# Patient Record
Sex: FEMALE | Race: WHITE | Employment: FULL TIME | URBAN - METROPOLITAN AREA
[De-identification: names, ages, dates, MRNs, and addresses within clinical notes are randomized per-mention and may not be internally consistent; named-entity substitution may affect disease eponyms.]

---

## 2023-07-26 ENCOUNTER — LAB ENCOUNTER (OUTPATIENT)
Dept: LAB | Age: 68
End: 2023-07-26
Attending: STUDENT IN AN ORGANIZED HEALTH CARE EDUCATION/TRAINING PROGRAM
Payer: MEDICARE

## 2023-07-26 ENCOUNTER — HOSPITAL ENCOUNTER (OUTPATIENT)
Dept: GENERAL RADIOLOGY | Age: 68
Discharge: HOME | End: 2023-07-26
Attending: STUDENT IN AN ORGANIZED HEALTH CARE EDUCATION/TRAINING PROGRAM
Payer: MEDICARE

## 2023-07-26 DIAGNOSIS — Z01.89 RADIOLOGICAL EXAMINATION, NOT ELSEWHERE CLASSIFIED: Primary | ICD-10-CM

## 2023-07-26 DIAGNOSIS — Z01.818 PREOPERATIVE EXAMINATION: ICD-10-CM

## 2023-07-26 LAB
ALBUMIN SERPL-MCNC: 3.8 G/DL (ref 3.4–5)
ALBUMIN/GLOB SERPL: 1.2 {RATIO} (ref 1–2)
ALP LIVER SERPL-CCNC: 95 U/L
ALT SERPL-CCNC: 29 U/L
ANION GAP SERPL CALC-SCNC: 4 MMOL/L (ref 0–18)
APTT PPP: 26.6 SECONDS (ref 23.3–35.6)
AST SERPL-CCNC: 15 U/L (ref 15–37)
BASOPHILS # BLD AUTO: 0.01 X10(3) UL (ref 0–0.2)
BASOPHILS NFR BLD AUTO: 0.2 %
BILIRUB SERPL-MCNC: 0.5 MG/DL (ref 0.1–2)
BILIRUB UR QL STRIP.AUTO: NEGATIVE
BUN BLD-MCNC: 21 MG/DL (ref 7–18)
CALCIUM BLD-MCNC: 8.8 MG/DL (ref 8.5–10.1)
CHLORIDE SERPL-SCNC: 106 MMOL/L (ref 98–112)
CLARITY UR REFRACT.AUTO: CLEAR
CO2 SERPL-SCNC: 28 MMOL/L (ref 21–32)
CREAT BLD-MCNC: 0.8 MG/DL
EGFRCR SERPLBLD CKD-EPI 2021: 81 ML/MIN/1.73M2 (ref 60–?)
EOSINOPHIL # BLD AUTO: 0.02 X10(3) UL (ref 0–0.7)
EOSINOPHIL NFR BLD AUTO: 0.5 %
ERYTHROCYTE [DISTWIDTH] IN BLOOD BY AUTOMATED COUNT: 12.3 %
FASTING STATUS PATIENT QL REPORTED: YES
GLOBULIN PLAS-MCNC: 3.2 G/DL (ref 2.8–4.4)
GLUCOSE BLD-MCNC: 107 MG/DL (ref 70–99)
GLUCOSE UR STRIP.AUTO-MCNC: NEGATIVE MG/DL
HCT VFR BLD AUTO: 39.8 %
HGB BLD-MCNC: 13.2 G/DL
IMM GRANULOCYTES # BLD AUTO: 0.05 X10(3) UL (ref 0–1)
IMM GRANULOCYTES NFR BLD: 1.2 %
INR BLD: 0.91 (ref 0.85–1.16)
KETONES UR STRIP.AUTO-MCNC: NEGATIVE MG/DL
LYMPHOCYTES # BLD AUTO: 1.39 X10(3) UL (ref 1–4)
LYMPHOCYTES NFR BLD AUTO: 32 %
MCH RBC QN AUTO: 31.6 PG (ref 26–34)
MCHC RBC AUTO-ENTMCNC: 33.2 G/DL (ref 31–37)
MCV RBC AUTO: 95.2 FL
MONOCYTES # BLD AUTO: 0.51 X10(3) UL (ref 0.1–1)
MONOCYTES NFR BLD AUTO: 11.8 %
NEUTROPHILS # BLD AUTO: 2.36 X10 (3) UL (ref 1.5–7.7)
NEUTROPHILS # BLD AUTO: 2.36 X10(3) UL (ref 1.5–7.7)
NEUTROPHILS NFR BLD AUTO: 54.3 %
NITRITE UR QL STRIP.AUTO: NEGATIVE
OSMOLALITY SERPL CALC.SUM OF ELEC: 289 MOSM/KG (ref 275–295)
PH UR STRIP.AUTO: 5 [PH] (ref 5–8)
PLATELET # BLD AUTO: 139 10(3)UL (ref 150–450)
POTASSIUM SERPL-SCNC: 4.4 MMOL/L (ref 3.5–5.1)
PROT SERPL-MCNC: 7 G/DL (ref 6.4–8.2)
PROT UR STRIP.AUTO-MCNC: NEGATIVE MG/DL
PROTHROMBIN TIME: 12.3 SECONDS (ref 11.6–14.8)
RBC # BLD AUTO: 4.18 X10(6)UL
RBC UR QL AUTO: NEGATIVE
SODIUM SERPL-SCNC: 138 MMOL/L (ref 136–145)
SP GR UR STRIP.AUTO: 1.01 (ref 1–1.03)
UROBILINOGEN UR STRIP.AUTO-MCNC: <2 MG/DL
WBC # BLD AUTO: 4.3 X10(3) UL (ref 4–11)

## 2023-07-26 PROCEDURE — 87147 CULTURE TYPE IMMUNOLOGIC: CPT

## 2023-07-26 PROCEDURE — 81001 URINALYSIS AUTO W/SCOPE: CPT

## 2023-07-26 PROCEDURE — 87086 URINE CULTURE/COLONY COUNT: CPT

## 2023-07-26 PROCEDURE — 85730 THROMBOPLASTIN TIME PARTIAL: CPT

## 2023-07-26 PROCEDURE — 87081 CULTURE SCREEN ONLY: CPT

## 2023-07-26 PROCEDURE — 93005 ELECTROCARDIOGRAM TRACING: CPT

## 2023-07-26 PROCEDURE — 85610 PROTHROMBIN TIME: CPT

## 2023-07-26 PROCEDURE — 36415 COLL VENOUS BLD VENIPUNCTURE: CPT

## 2023-07-26 PROCEDURE — 71046 X-RAY EXAM CHEST 2 VIEWS: CPT | Performed by: STUDENT IN AN ORGANIZED HEALTH CARE EDUCATION/TRAINING PROGRAM

## 2023-07-26 PROCEDURE — 80053 COMPREHEN METABOLIC PANEL: CPT

## 2023-07-26 PROCEDURE — 93010 ELECTROCARDIOGRAM REPORT: CPT | Performed by: INTERNAL MEDICINE

## 2023-07-26 PROCEDURE — 85025 COMPLETE CBC W/AUTO DIFF WBC: CPT

## 2023-07-27 LAB
ATRIAL RATE: 54 BPM
P AXIS: 28 DEGREES
P-R INTERVAL: 164 MS
Q-T INTERVAL: 426 MS
QRS DURATION: 86 MS
QTC CALCULATION (BEZET): 403 MS
R AXIS: 55 DEGREES
T AXIS: 49 DEGREES
VENTRICULAR RATE: 54 BPM

## 2023-07-27 RX ORDER — PREGABALIN 50 MG/1
1 CAPSULE ORAL 3 TIMES DAILY
COMMUNITY
Start: 2022-07-28

## 2023-07-27 RX ORDER — MELOXICAM 15 MG/1
1 TABLET ORAL DAILY
COMMUNITY
Start: 2020-08-31

## 2023-08-03 ENCOUNTER — HOSPITAL ENCOUNTER (INPATIENT)
Facility: HOSPITAL | Age: 68
LOS: 1 days | Discharge: HOME | End: 2023-08-04
Attending: STUDENT IN AN ORGANIZED HEALTH CARE EDUCATION/TRAINING PROGRAM | Admitting: STUDENT IN AN ORGANIZED HEALTH CARE EDUCATION/TRAINING PROGRAM
Payer: MEDICARE

## 2023-08-03 ENCOUNTER — APPOINTMENT (OUTPATIENT)
Dept: GENERAL RADIOLOGY | Facility: HOSPITAL | Age: 68
End: 2023-08-03
Attending: STUDENT IN AN ORGANIZED HEALTH CARE EDUCATION/TRAINING PROGRAM
Payer: MEDICARE

## 2023-08-03 ENCOUNTER — ANESTHESIA (OUTPATIENT)
Dept: SURGERY | Facility: HOSPITAL | Age: 68
End: 2023-08-03
Payer: MEDICARE

## 2023-08-03 ENCOUNTER — ANESTHESIA EVENT (OUTPATIENT)
Dept: SURGERY | Facility: HOSPITAL | Age: 68
End: 2023-08-03
Payer: MEDICARE

## 2023-08-03 DIAGNOSIS — Z01.818 PREOP TESTING: Primary | ICD-10-CM

## 2023-08-03 PROBLEM — I10 ESSENTIAL HYPERTENSION: Status: ACTIVE | Noted: 2023-08-03

## 2023-08-03 PROBLEM — E66.01 MORBID OBESITY WITH BMI OF 40.0-44.9, ADULT (HCC): Status: ACTIVE | Noted: 2023-08-03

## 2023-08-03 PROBLEM — E03.9 HYPOTHYROIDISM: Status: ACTIVE | Noted: 2023-08-03

## 2023-08-03 PROBLEM — T84.063A POLYETHYLENE WEAR OF LEFT KNEE JOINT PROSTHESIS (HCC): Status: ACTIVE | Noted: 2023-08-03

## 2023-08-03 LAB
ANTIBODY SCREEN: NEGATIVE
DEPRECATED RDW RBC AUTO: 41.9 FL (ref 35.1–46.3)
ERYTHROCYTE [DISTWIDTH] IN BLOOD BY AUTOMATED COUNT: 12 % (ref 11–15)
HCT VFR BLD AUTO: 37.5 %
HGB BLD-MCNC: 12.6 G/DL
MCH RBC QN AUTO: 32 PG (ref 26–34)
MCHC RBC AUTO-ENTMCNC: 33.6 G/DL (ref 31–37)
MCV RBC AUTO: 95.2 FL
PLATELET # BLD AUTO: 130 10(3)UL (ref 150–450)
RBC # BLD AUTO: 3.94 X10(6)UL
RH BLOOD TYPE: POSITIVE
RH BLOOD TYPE: POSITIVE
WBC # BLD AUTO: 4.3 X10(3) UL (ref 4–11)

## 2023-08-03 PROCEDURE — 73560 X-RAY EXAM OF KNEE 1 OR 2: CPT | Performed by: STUDENT IN AN ORGANIZED HEALTH CARE EDUCATION/TRAINING PROGRAM

## 2023-08-03 PROCEDURE — 0S9D3ZZ DRAINAGE OF LEFT KNEE JOINT, PERCUTANEOUS APPROACH: ICD-10-PCS | Performed by: STUDENT IN AN ORGANIZED HEALTH CARE EDUCATION/TRAINING PROGRAM

## 2023-08-03 PROCEDURE — 0SRW0J9 REPLACEMENT OF LEFT KNEE JOINT, TIBIAL SURFACE WITH SYNTHETIC SUBSTITUTE, CEMENTED, OPEN APPROACH: ICD-10-PCS | Performed by: STUDENT IN AN ORGANIZED HEALTH CARE EDUCATION/TRAINING PROGRAM

## 2023-08-03 PROCEDURE — 3E0U3BZ INTRODUCTION OF ANESTHETIC AGENT INTO JOINTS, PERCUTANEOUS APPROACH: ICD-10-PCS | Performed by: STUDENT IN AN ORGANIZED HEALTH CARE EDUCATION/TRAINING PROGRAM

## 2023-08-03 PROCEDURE — 0SPW0JZ REMOVAL OF SYNTHETIC SUBSTITUTE FROM LEFT KNEE JOINT, TIBIAL SURFACE, OPEN APPROACH: ICD-10-PCS | Performed by: STUDENT IN AN ORGANIZED HEALTH CARE EDUCATION/TRAINING PROGRAM

## 2023-08-03 DEVICE — IMPLANTABLE DEVICE: Type: IMPLANTABLE DEVICE | Site: KNEE | Status: FUNCTIONAL

## 2023-08-03 DEVICE — NEX STR STEM EXT 15 DIX75 30: Type: IMPLANTABLE DEVICE | Site: KNEE | Status: FUNCTIONAL

## 2023-08-03 DEVICE — IMPLANTABLE DEVICE
Type: IMPLANTABLE DEVICE | Site: KNEE | Status: FUNCTIONAL
Brand: REFOBACIN® BONE CEMENT R

## 2023-08-03 DEVICE — IMPLANTABLE DEVICE
Type: IMPLANTABLE DEVICE | Site: KNEE | Status: FUNCTIONAL
Brand: NEXGEN® LEGACY® PROLONG®

## 2023-08-03 DEVICE — NEX A/P WDGD PRCT TIB PLT SZ 3: Type: IMPLANTABLE DEVICE | Site: KNEE | Status: FUNCTIONAL

## 2023-08-03 RX ORDER — SODIUM CHLORIDE, SODIUM LACTATE, POTASSIUM CHLORIDE, CALCIUM CHLORIDE 600; 310; 30; 20 MG/100ML; MG/100ML; MG/100ML; MG/100ML
INJECTION, SOLUTION INTRAVENOUS CONTINUOUS
Status: DISCONTINUED | OUTPATIENT
Start: 2023-08-03 | End: 2023-08-03 | Stop reason: HOSPADM

## 2023-08-03 RX ORDER — LEVOTHYROXINE SODIUM 0.07 MG/1
150 TABLET ORAL
Status: DISCONTINUED | OUTPATIENT
Start: 2023-08-04 | End: 2023-08-04

## 2023-08-03 RX ORDER — DIPHENHYDRAMINE HCL 25 MG
25 CAPSULE ORAL EVERY 4 HOURS PRN
Status: DISCONTINUED | OUTPATIENT
Start: 2023-08-03 | End: 2023-08-04

## 2023-08-03 RX ORDER — ACETAMINOPHEN 500 MG
1000 TABLET ORAL EVERY 6 HOURS
Status: DISCONTINUED | OUTPATIENT
Start: 2023-08-03 | End: 2023-08-04

## 2023-08-03 RX ORDER — HYDROMORPHONE HYDROCHLORIDE 1 MG/ML
0.4 INJECTION, SOLUTION INTRAMUSCULAR; INTRAVENOUS; SUBCUTANEOUS EVERY 2 HOUR PRN
Status: DISCONTINUED | OUTPATIENT
Start: 2023-08-03 | End: 2023-08-04

## 2023-08-03 RX ORDER — TRANEXAMIC ACID 10 MG/ML
1000 INJECTION, SOLUTION INTRAVENOUS ONCE
Status: DISCONTINUED | OUTPATIENT
Start: 2023-08-03 | End: 2023-08-03 | Stop reason: HOSPADM

## 2023-08-03 RX ORDER — HYDROMORPHONE HYDROCHLORIDE 1 MG/ML
0.6 INJECTION, SOLUTION INTRAMUSCULAR; INTRAVENOUS; SUBCUTANEOUS EVERY 5 MIN PRN
Status: DISCONTINUED | OUTPATIENT
Start: 2023-08-03 | End: 2023-08-03 | Stop reason: HOSPADM

## 2023-08-03 RX ORDER — CEFAZOLIN SODIUM/WATER 2 G/20 ML
2 SYRINGE (ML) INTRAVENOUS ONCE
Status: COMPLETED | OUTPATIENT
Start: 2023-08-03 | End: 2023-08-03

## 2023-08-03 RX ORDER — POLYETHYLENE GLYCOL 3350 17 G/17G
17 POWDER, FOR SOLUTION ORAL DAILY PRN
Status: DISCONTINUED | OUTPATIENT
Start: 2023-08-03 | End: 2023-08-04

## 2023-08-03 RX ORDER — FAMOTIDINE 20 MG/1
20 TABLET, FILM COATED ORAL ONCE
Status: COMPLETED | OUTPATIENT
Start: 2023-08-03 | End: 2023-08-03

## 2023-08-03 RX ORDER — HYDROMORPHONE HYDROCHLORIDE 1 MG/ML
0.4 INJECTION, SOLUTION INTRAMUSCULAR; INTRAVENOUS; SUBCUTANEOUS EVERY 5 MIN PRN
Status: DISCONTINUED | OUTPATIENT
Start: 2023-08-03 | End: 2023-08-03 | Stop reason: HOSPADM

## 2023-08-03 RX ORDER — VANCOMYCIN 1.75 GRAM/500 ML IN 0.9 % SODIUM CHLORIDE INTRAVENOUS
15 ONCE
Status: COMPLETED | OUTPATIENT
Start: 2023-08-03 | End: 2023-08-03

## 2023-08-03 RX ORDER — METOCLOPRAMIDE 10 MG/1
10 TABLET ORAL ONCE
Status: COMPLETED | OUTPATIENT
Start: 2023-08-03 | End: 2023-08-03

## 2023-08-03 RX ORDER — OXYCODONE HYDROCHLORIDE 5 MG/1
5 TABLET ORAL EVERY 4 HOURS PRN
Status: DISCONTINUED | OUTPATIENT
Start: 2023-08-03 | End: 2023-08-04

## 2023-08-03 RX ORDER — CEFAZOLIN SODIUM/WATER 2 G/20 ML
2 SYRINGE (ML) INTRAVENOUS EVERY 8 HOURS
Status: COMPLETED | OUTPATIENT
Start: 2023-08-04 | End: 2023-08-04

## 2023-08-03 RX ORDER — ENEMA 19; 7 G/133ML; G/133ML
1 ENEMA RECTAL ONCE AS NEEDED
Status: DISCONTINUED | OUTPATIENT
Start: 2023-08-03 | End: 2023-08-04

## 2023-08-03 RX ORDER — DEXAMETHASONE SODIUM PHOSPHATE 4 MG/ML
VIAL (ML) INJECTION AS NEEDED
Status: DISCONTINUED | OUTPATIENT
Start: 2023-08-03 | End: 2023-08-03 | Stop reason: SURG

## 2023-08-03 RX ORDER — SPIRONOLACTONE 50 MG/1
100 TABLET, FILM COATED ORAL DAILY
Status: DISCONTINUED | OUTPATIENT
Start: 2023-08-04 | End: 2023-08-04

## 2023-08-03 RX ORDER — NALOXONE HYDROCHLORIDE 0.4 MG/ML
80 INJECTION, SOLUTION INTRAMUSCULAR; INTRAVENOUS; SUBCUTANEOUS AS NEEDED
Status: DISCONTINUED | OUTPATIENT
Start: 2023-08-03 | End: 2023-08-03 | Stop reason: HOSPADM

## 2023-08-03 RX ORDER — DIPHENHYDRAMINE HYDROCHLORIDE 50 MG/ML
12.5 INJECTION INTRAMUSCULAR; INTRAVENOUS EVERY 4 HOURS PRN
Status: DISCONTINUED | OUTPATIENT
Start: 2023-08-03 | End: 2023-08-04

## 2023-08-03 RX ORDER — DIPHENHYDRAMINE HYDROCHLORIDE 50 MG/ML
25 INJECTION INTRAMUSCULAR; INTRAVENOUS ONCE AS NEEDED
Status: ACTIVE | OUTPATIENT
Start: 2023-08-03 | End: 2023-08-03

## 2023-08-03 RX ORDER — DOCUSATE SODIUM 100 MG/1
100 CAPSULE, LIQUID FILLED ORAL 2 TIMES DAILY
Status: DISCONTINUED | OUTPATIENT
Start: 2023-08-03 | End: 2023-08-04

## 2023-08-03 RX ORDER — SODIUM CHLORIDE, SODIUM LACTATE, POTASSIUM CHLORIDE, CALCIUM CHLORIDE 600; 310; 30; 20 MG/100ML; MG/100ML; MG/100ML; MG/100ML
INJECTION, SOLUTION INTRAVENOUS CONTINUOUS
Status: DISCONTINUED | OUTPATIENT
Start: 2023-08-03 | End: 2023-08-04

## 2023-08-03 RX ORDER — ONDANSETRON 2 MG/ML
INJECTION INTRAMUSCULAR; INTRAVENOUS AS NEEDED
Status: DISCONTINUED | OUTPATIENT
Start: 2023-08-03 | End: 2023-08-03 | Stop reason: SURG

## 2023-08-03 RX ORDER — OXYCODONE HYDROCHLORIDE 5 MG/1
10 TABLET ORAL EVERY 4 HOURS PRN
Status: DISCONTINUED | OUTPATIENT
Start: 2023-08-03 | End: 2023-08-04

## 2023-08-03 RX ORDER — MORPHINE SULFATE 10 MG/ML
6 INJECTION, SOLUTION INTRAMUSCULAR; INTRAVENOUS EVERY 10 MIN PRN
Status: DISCONTINUED | OUTPATIENT
Start: 2023-08-03 | End: 2023-08-03 | Stop reason: HOSPADM

## 2023-08-03 RX ORDER — MORPHINE SULFATE 4 MG/ML
2 INJECTION, SOLUTION INTRAMUSCULAR; INTRAVENOUS EVERY 10 MIN PRN
Status: DISCONTINUED | OUTPATIENT
Start: 2023-08-03 | End: 2023-08-03 | Stop reason: HOSPADM

## 2023-08-03 RX ORDER — ALPRAZOLAM 0.5 MG/1
0.5 TABLET ORAL 2 TIMES DAILY PRN
Status: DISCONTINUED | OUTPATIENT
Start: 2023-08-03 | End: 2023-08-04

## 2023-08-03 RX ORDER — ONDANSETRON 2 MG/ML
4 INJECTION INTRAMUSCULAR; INTRAVENOUS EVERY 6 HOURS PRN
Status: DISCONTINUED | OUTPATIENT
Start: 2023-08-03 | End: 2023-08-03 | Stop reason: HOSPADM

## 2023-08-03 RX ORDER — SENNOSIDES 8.6 MG
17.2 TABLET ORAL NIGHTLY
Status: DISCONTINUED | OUTPATIENT
Start: 2023-08-03 | End: 2023-08-04

## 2023-08-03 RX ORDER — METOCLOPRAMIDE HYDROCHLORIDE 5 MG/ML
10 INJECTION INTRAMUSCULAR; INTRAVENOUS EVERY 8 HOURS PRN
Status: DISCONTINUED | OUTPATIENT
Start: 2023-08-03 | End: 2023-08-04

## 2023-08-03 RX ORDER — BUPROPION HYDROCHLORIDE 300 MG/1
300 TABLET ORAL DAILY
Status: DISCONTINUED | OUTPATIENT
Start: 2023-08-04 | End: 2023-08-04

## 2023-08-03 RX ORDER — ASPIRIN 325 MG
325 TABLET, DELAYED RELEASE (ENTERIC COATED) ORAL 2 TIMES DAILY
Status: DISCONTINUED | OUTPATIENT
Start: 2023-08-03 | End: 2023-08-04

## 2023-08-03 RX ORDER — LIDOCAINE HYDROCHLORIDE 10 MG/ML
INJECTION, SOLUTION EPIDURAL; INFILTRATION; INTRACAUDAL; PERINEURAL AS NEEDED
Status: DISCONTINUED | OUTPATIENT
Start: 2023-08-03 | End: 2023-08-03 | Stop reason: SURG

## 2023-08-03 RX ORDER — HYDROMORPHONE HYDROCHLORIDE 1 MG/ML
0.2 INJECTION, SOLUTION INTRAMUSCULAR; INTRAVENOUS; SUBCUTANEOUS EVERY 5 MIN PRN
Status: DISCONTINUED | OUTPATIENT
Start: 2023-08-03 | End: 2023-08-03 | Stop reason: HOSPADM

## 2023-08-03 RX ORDER — ONDANSETRON 2 MG/ML
4 INJECTION INTRAMUSCULAR; INTRAVENOUS EVERY 6 HOURS PRN
Status: DISCONTINUED | OUTPATIENT
Start: 2023-08-03 | End: 2023-08-04

## 2023-08-03 RX ORDER — MORPHINE SULFATE 4 MG/ML
4 INJECTION, SOLUTION INTRAMUSCULAR; INTRAVENOUS EVERY 10 MIN PRN
Status: DISCONTINUED | OUTPATIENT
Start: 2023-08-03 | End: 2023-08-03 | Stop reason: HOSPADM

## 2023-08-03 RX ORDER — METOCLOPRAMIDE HYDROCHLORIDE 5 MG/ML
10 INJECTION INTRAMUSCULAR; INTRAVENOUS EVERY 8 HOURS PRN
Status: DISCONTINUED | OUTPATIENT
Start: 2023-08-03 | End: 2023-08-03 | Stop reason: HOSPADM

## 2023-08-03 RX ORDER — ACETAMINOPHEN 500 MG
1000 TABLET ORAL ONCE
Status: COMPLETED | OUTPATIENT
Start: 2023-08-03 | End: 2023-08-03

## 2023-08-03 RX ORDER — FUROSEMIDE 40 MG/1
40 TABLET ORAL DAILY
Status: DISCONTINUED | OUTPATIENT
Start: 2023-08-04 | End: 2023-08-04

## 2023-08-03 RX ORDER — PREGABALIN 50 MG/1
50 CAPSULE ORAL 3 TIMES DAILY
Status: DISCONTINUED | OUTPATIENT
Start: 2023-08-03 | End: 2023-08-04

## 2023-08-03 RX ORDER — KETOROLAC TROMETHAMINE 30 MG/ML
30 INJECTION, SOLUTION INTRAMUSCULAR; INTRAVENOUS EVERY 6 HOURS
Status: DISCONTINUED | OUTPATIENT
Start: 2023-08-03 | End: 2023-08-04

## 2023-08-03 RX ORDER — LIDOCAINE HYDROCHLORIDE 10 MG/ML
INJECTION, SOLUTION INFILTRATION; PERINEURAL
Status: COMPLETED | OUTPATIENT
Start: 2023-08-03 | End: 2023-08-03

## 2023-08-03 RX ORDER — BISACODYL 10 MG
10 SUPPOSITORY, RECTAL RECTAL
Status: DISCONTINUED | OUTPATIENT
Start: 2023-08-03 | End: 2023-08-04

## 2023-08-03 RX ORDER — BUPIVACAINE HYDROCHLORIDE 7.5 MG/ML
INJECTION, SOLUTION INTRASPINAL
Status: COMPLETED | OUTPATIENT
Start: 2023-08-03 | End: 2023-08-03

## 2023-08-03 RX ORDER — HYDROMORPHONE HYDROCHLORIDE 1 MG/ML
0.8 INJECTION, SOLUTION INTRAMUSCULAR; INTRAVENOUS; SUBCUTANEOUS EVERY 2 HOUR PRN
Status: DISCONTINUED | OUTPATIENT
Start: 2023-08-03 | End: 2023-08-04

## 2023-08-03 RX ADMIN — CEFAZOLIN SODIUM/WATER 2 G: 2 G/20 ML SYRINGE (ML) INTRAVENOUS at 17:44:00

## 2023-08-03 RX ADMIN — ONDANSETRON 4 MG: 2 INJECTION INTRAMUSCULAR; INTRAVENOUS at 18:10:00

## 2023-08-03 RX ADMIN — LIDOCAINE HYDROCHLORIDE 5 ML: 10 INJECTION, SOLUTION INFILTRATION; PERINEURAL at 17:38:00

## 2023-08-03 RX ADMIN — BUPIVACAINE HYDROCHLORIDE 1.6 ML: 7.5 INJECTION, SOLUTION INTRASPINAL at 17:38:00

## 2023-08-03 RX ADMIN — SODIUM CHLORIDE, SODIUM LACTATE, POTASSIUM CHLORIDE, CALCIUM CHLORIDE: 600; 310; 30; 20 INJECTION, SOLUTION INTRAVENOUS at 17:35:00

## 2023-08-03 RX ADMIN — DEXAMETHASONE SODIUM PHOSPHATE 4 MG: 4 MG/ML VIAL (ML) INJECTION at 18:10:00

## 2023-08-03 RX ADMIN — LIDOCAINE HYDROCHLORIDE 50 MG: 10 INJECTION, SOLUTION EPIDURAL; INFILTRATION; INTRACAUDAL; PERINEURAL at 17:46:00

## 2023-08-03 NOTE — ANESTHESIA PROCEDURE NOTES
Spinal Block    Date/Time: 8/3/2023 5:38 PM    Performed by: Gemma Bojorquez CRNA  Authorized by: Angel Quiroz MD      General Information and Staff    Start Time:  8/3/2023 5:38 PM  End Time:  8/3/2023 5:43 PM  Anesthesiologist:  Angel Quiroz MD  CRNA:  Gemma Bojorquez CRNA  Performed by:  CRNA  Patient Location:  OR  Site identification: surface landmarks    Reason for Block: at surgeon's request and surgical anesthesia    Preanesthetic Checklist: patient identified, IV checked, risks and benefits discussed, monitors and equipment checked, pre-op evaluation, timeout performed, anesthesia consent and sterile technique used      Procedure Details    Patient Position:  Sitting  Prep: ChloraPrep    Monitoring:  Cardiac monitor  Approach:  Midline  Location:  L3-4  Injection Technique:  Single-shot    Needle    Needle Type:  Quincke  Needle Gauge:  22 G  Needle Length:  3.5 in    Assessment    Sensory Level:  T8  Events: clear CSF, CSF aspirated, well tolerated and blood negative      Additional Comments

## 2023-08-04 VITALS
HEART RATE: 62 BPM | HEIGHT: 67 IN | DIASTOLIC BLOOD PRESSURE: 59 MMHG | BODY MASS INDEX: 42.53 KG/M2 | OXYGEN SATURATION: 96 % | WEIGHT: 271 LBS | SYSTOLIC BLOOD PRESSURE: 104 MMHG | TEMPERATURE: 98 F | RESPIRATION RATE: 18 BRPM

## 2023-08-04 LAB
ANION GAP SERPL CALC-SCNC: 5 MMOL/L (ref 0–18)
BUN BLD-MCNC: 13 MG/DL (ref 7–18)
BUN/CREAT SERPL: 16.7 (ref 10–20)
CALCIUM BLD-MCNC: 8.6 MG/DL (ref 8.5–10.1)
CHLORIDE SERPL-SCNC: 111 MMOL/L (ref 98–112)
CO2 SERPL-SCNC: 25 MMOL/L (ref 21–32)
CREAT BLD-MCNC: 0.78 MG/DL
EGFRCR SERPLBLD CKD-EPI 2021: 84 ML/MIN/1.73M2 (ref 60–?)
GLUCOSE BLD-MCNC: 123 MG/DL (ref 70–99)
HCT VFR BLD AUTO: 34 %
HGB BLD-MCNC: 11.4 G/DL
OSMOLALITY SERPL CALC.SUM OF ELEC: 293 MOSM/KG (ref 275–295)
POTASSIUM SERPL-SCNC: 4.4 MMOL/L (ref 3.5–5.1)
SODIUM SERPL-SCNC: 141 MMOL/L (ref 136–145)

## 2023-08-04 PROCEDURE — 99239 HOSP IP/OBS DSCHRG MGMT >30: CPT | Performed by: HOSPITALIST

## 2023-08-04 RX ORDER — PSEUDOEPHEDRINE HCL 30 MG
100 TABLET ORAL 2 TIMES DAILY
Qty: 60 CAPSULE | Refills: 0 | Status: SHIPPED | OUTPATIENT
Start: 2023-08-04

## 2023-08-04 RX ORDER — CEFADROXIL 500 MG/1
500 CAPSULE ORAL 2 TIMES DAILY
Refills: 0 | Status: SHIPPED | COMMUNITY
Start: 2023-08-04

## 2023-08-04 RX ORDER — ASPIRIN 325 MG
325 TABLET, DELAYED RELEASE (ENTERIC COATED) ORAL 2 TIMES DAILY
Refills: 0 | Status: SHIPPED | COMMUNITY
Start: 2023-08-04

## 2023-08-04 NOTE — OPERATIVE REPORT
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  Morbid Obesity (BMI 41.35kg/m2)    PROCEDURE:  Left Revision total knee arthroplasty (tibial component) - Modifier 22  Application of negative pressure incisional dressing    Location: [facility]    SURGEON: Franklyn Obando MD  Assistant(s): Ashtyn Hoffman MD    Anesthesia type:  Spinal  Estimated Blood Loss: 100cc    Drains: None    Complications: None immediately apparent    Explants:   Celi NexGen size 3 tibia  17mm LPS flex poly    Implants:  Celi NexGen size 3 AP Wedge tibia with [id number] stem and 5mm medial/lateral augments  17mm LPS flex poly  Fixed Tibia Tibial Central Cone (Persona)  Specimen: Tissue cultures  Findings:Poly liner dissociation from the tibial tray. Moderate synovitis. Indication: This is a 78year old lady, history of a primary TKA in the past , revised with polyethylene exchange for instability by partner, who presented with after a fall, with pre-operative radiographs demonstrating poly liner dissociation from the tray. Surgical versus non-surgical options were discussed with the patient, and together we decided it is best to proceed with a revision tibial component given poly size, and lack of tibial tray for poly screw accomodation. All risks and benefits of surgery including bleeding, infection, joint instability, temitope-prosthetic fracture, extensor mechanism injury, neurovascular injury, as well as medical and anesthesia-related complications were discussed with the patient / family, who elected to proceed with surgery.      Procedure in detail:    The patient was taken to the operating room and positioned supine on a regular table, where the above anesthesia was administered. Intravenous antibiotics were administered. The patient was positioned supine, and all bony prominences were well-padded. The knee was then prepped and draped in sterile fashion. A pre-surgical pause was performed to correctly identify the patient, procedure, and extremity, including verification of the signed surgical site. The extremity was exsanguinated using Esmarch bandage, and the tourniquet was raised to 350mmHg. A midline incision was made utilizing the patient's prior scar. Full thickness sub-fascial medial and lateral flaps were raised. Arthrocentesis was performed yielding 5cc of synovial fluid which was sent for culture. A medial parapatellar arthrotomy was made, and a medial proximal tibial subperiosteal elevation was done for exposure. The fat pad was elevated off of the anterolateral tibial plateau. The distal femoral synovium was recessed. A partial medial and lateral synovectomy was performed for exposure. The poly was noted to be disengaged from the front of the tibial tray. The poly was removed using an osteotome. The knee was flexed, and the tibia subluxed anteriorly after a posteromedial release. The tibial tray - bone interface was exposed. A single sided reciprocating saw was used to cut the tibial tray/cement interface. The tibia was then exposed and extracted with the femoral/tibial extractor tamp. An extramedullary tibial guide was then used to resect 2mm off of the proximal tibial surface, in neutral mechanical alignment. A 1/4 inch osteotome was used to break the canal cement, and the cement was removed piecemeal. The tibia was sized to a size 3 baseplate, and the template guide was pinned in place. The boss reamer was used to open the metaphysis and the keel was punched. The trial guide was removed. The tibia was reamed for a short stem, 30mm, and a cone broach was used to prepare the metaphysis for a size fixed tibia persona cone.      A trial cone and tibial component with augments were placed. A 17mm LPS-flex poly achieved appropriate and symmetric flexion/extension balance. A temitope-articular pain cocktail injection was delivered to the soft tissues. The trials were removed and the tibia was exposed. An appropriate size cement restrictor was placed in the canal. The canal was irrigated and dried. The final tibial cone was impacted into place. Two batches of Refobacin cement with gentamicin were mixed and delivered into the tibial canal using a cement gun. The cement was pressurized and the tibial implant was impacted into place. Excess cement was removed. The tibial component was held in place until cement polymerized. The tourniquet was deflated and meticulous hemostasis was achieved. The joint was irrigated before placement and locking of the final size 17 LPS liner. The final poly screw was inserted and tightened into the tibial tray. The knee was then thoroughly irrigated once again. The arthrotomy was then closed with a combination of #5 Ethibond, #1 Vicryl and #2 Nancy Radha. The subcutaneous tissues and skin were closed with 0 Vicryl, 2-0 vicryl and 3-0 nylon. A sterile negative pressure dressing was then applied followed by compressive bandage. The patient tolerated the procedure well, and there were no known immediate complications. We billed for modifier 22 in this case given BMI of 41.35kg/m2 (morbid obesity) and soft tissue envelope requiring more time and effort for exposure, component extraction and final component implantation. Post-operative Plan:    The intra-operative cultures will be followed. VTE prophylaxis will consist of early mobilization, mechanical compressive devices, and Aspirin 325 BID if not medically contra-indicated. The patient will be weight-bearing as tolerated and allowed to mobilize with physical therapy.   The patient will be permitted range of motion as tolerated    The patient will follow up in clinic in 2 weeks for a wound check, suture removal and radiographic evaluation.

## 2023-08-04 NOTE — PLAN OF CARE
Pt is A&Ox4. Clear for dc. Instructions given at bedside.     Problem: Patient Centered Care  Goal: Patient preferences are identified and integrated in the patient's plan of care  Description: Interventions:  - What would you like us to know as we care for you?   - Provide timely, complete, and accurate information to patient/family  - Incorporate patient and family knowledge, values, beliefs, and cultural backgrounds into the planning and delivery of care  - Encourage patient/family to participate in care and decision-making at the level they choose  - Honor patient and family perspectives and choices  Outcome: Adequate for Discharge     Problem: Patient/Family Goals  Goal: Patient/Family Long Term Goal  Description: Patient's Long Term Goal:     Interventions:  -   - See additional Care Plan goals for specific interventions  Outcome: Adequate for Discharge  Goal: Patient/Family Short Term Goal  Description: Patient's Short Term Goal:     Interventions:   - See additional Care Plan goals for specific interventions  Outcome: Adequate for Discharge     Problem: PAIN - ADULT  Goal: Verbalizes/displays adequate comfort level or patient's stated pain goal  Description: INTERVENTIONS:  - Encourage pt to monitor pain and request assistance  - Assess pain using appropriate pain scale  - Administer analgesics based on type and severity of pain and evaluate response  - Implement non-pharmacological measures as appropriate and evaluate response  - Consider cultural and social influences on pain and pain management  - Manage/alleviate anxiety  - Utilize distraction and/or relaxation techniques  - Monitor for opioid side effects  - Notify MD/LIP if interventions unsuccessful or patient reports new pain  - Anticipate increased pain with activity and pre-medicate as appropriate  Outcome: Adequate for Discharge     Problem: SAFETY ADULT - FALL  Goal: Free from fall injury  Description: INTERVENTIONS:  - Assess pt frequently for physical needs  - Identify cognitive and physical deficits and behaviors that affect risk of falls.   - [place] fall precautions as indicated by assessment.  - Educate pt/family on patient safety including physical limitations  - Instruct pt to call for assistance with activity based on assessment  - Modify environment to reduce risk of injury  - Provide assistive devices as appropriate  - Consider OT/PT consult to assist with strengthening/mobility  - Encourage toileting schedule  Outcome: Adequate for Discharge     Problem: DISCHARGE PLANNING  Goal: Discharge to home or other facility with appropriate resources  Description: INTERVENTIONS:  - Identify barriers to discharge w/pt and caregiver  - Include patient/family/discharge partner in discharge planning  - Arrange for needed discharge resources and transportation as appropriate  - Identify discharge learning needs (meds, wound care, etc)  - Arrange for interpreters to assist at discharge as needed  - Consider post-discharge preferences of patient/family/discharge partner  - Complete POLST form as appropriate  - Assess patient's ability to be responsible for managing their own health  - Refer to Case Management Department for coordinating discharge planning if the patient needs post-hospital services based on physician/LIP order or complex needs related to functional status, cognitive ability or social support system  Outcome: Adequate for Discharge     Problem: SKIN/TISSUE INTEGRITY - ADULT  Goal: Incision(s), wounds(s) or drain site(s) healing without S/S of infection  Description: INTERVENTIONS:  - Assess and document risk factors for pressure ulcer development  - Assess and document skin integrity  - Assess and document dressing/incision, wound bed, drain sites and surrounding tissue  - Implement wound care per orders  - Initiate isolation precautions as appropriate  - Initiate Pressure Ulcer prevention bundle as indicated  Outcome: Adequate for Discharge

## 2023-08-04 NOTE — PHYSICAL THERAPY NOTE
PHYSICAL THERAPY EVALUATION - INPATIENT     Room Number: 416/416-A  Evaluation Date: 8/4/2023  Type of Evaluation: Initial   Physician Order: PT Eval and Treat    Presenting Problem: L TKA revision  Reason for Therapy: Mobility Dysfunction and Discharge Planning    PHYSICAL THERAPY ASSESSMENT   Orders received and chart reviewed. DENILSON Cohn approved participation in physical therapy. Gait belt donned. PPE worn by therapist: gloves. Patient was not wearing a mask during session. Patient is a 78year old female admitted 8/3/2023 for Presenting Problem: L TKA revision. Patient presented in bed with pain. Education provided on Total knee exercise protocol, Weight bearing status, Physical therapy plan of care, and physiological benefits of out of bed mobility. Patient with good carryover. Patient instructed in one set of ten reps of therex on paper provided. Patient on room air. Patient verbalized understanding of WBAT. Patient currently with SBA to mod I for mobility during the session with rolling walker, has rolling walker at home. Patient able to navigate 4 stairs with one handrail with single step pattern when ascending and descending. Patient with no concerns for home. Patient has [address] Patient oxygen sat 98% and heart rate 76, blood pressure 11/54. Patient demonstrates SBA to mod I with functional mobility skills as noted below. Patient verbalizes no further mobility concerns at this time, is functioning safely with mobility to D/C at this level of care. Updated nurse on results of session, patient left in bedside chair with family present, all lines intact, all needs met with call light in reach. Patient history and/or personal factors that may impact the plan of care include  none . Based on the physical therapy examination of the noted systems and mobility skills, the patient presentation is stable given the patient demonstrates no significant barriers to meeting therapy goals.  Physical Therapy to sign off at this time, please re-consult if patient experiences a decline in functional status. Bed mobility: Independent  Transfers: Modified independent  Gait Assistance: Supervision  Distance (ft): 400ft  Assistive Device: Rolling walker             Patient was left in bedside chair at end of session with all needs in reach. Patient does have the physical skills to return to prior living environment upon D/C from the hospital. Anticipate patient will not benefit from continued skilled physical therapy while in the hospital and can be discharged. The patient's Approx Degree of Impairment: 20.91% has been calculated based on documentation in the [facility] '6 clicks' Inpatient Basic Mobility Short Form. Research supports that patients with this level of impairment may benefit from Home with no services. RN aware of patient status post session.     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 ([address]  Active Problems:    Essential hypertension    Hypothyroidism    Morbid obesity with BMI of 40.0-44.9, adult ([address]    Preop testing    Past Medical History  Past Medical History:   Diagnosis Date    Anxiety state     Back problem     Cataract 2018    Disorder of thyroid     Migraines     Pneumonia due to organism 04/2021     Past Surgical History  Past Surgical History:   Procedure Laterality Date    [address] RELEASE  2017    R hand    CATARACTS, OPHTHM ([facility])  2018    R and Left    FOOT SURGERY Bilateral 2011    Buinon removel - repair of joint between two toes Both feet    HIP ARTHROPLASTY Left 08/2000    KNEE REPLACEMENT SURGERY  2010    Bilateral    LAPAROSCOPIC CHOLECYSTECTOMY  2010    OTHER SURGICAL HISTORY  2018    R shoulder Bicep tendon reattachment, supurior capsular reconstrction    OTHER SURGICAL HISTORY  2010    Nose reset    OTHER SURGICAL HISTORY      3 x Liver stone    ROTATOR CUFF REPAIR Left     TONSILLECTOMY  1978     HOME SITUATION  Type of Home: House   Home Layout: One level  Stairs to Enter : 4  Railing: Yes  Stairs to Bedroom: 0  Railing: No  Lives With: Spouse  Drives: Yes  Patient Owned Equipment: Rolling walker  Patient Regularly Uses: None    Prior Level of [place]: Patient reports being independent in ADL's and ambulation with cane prior to admission to the hospital.     SUBJECTIVE  \"I fell on my both knees\"    PHYSICAL THERAPY EXAMINATION     OBJECTIVE  Precautions: Limb alert - left  Fall Risk: Standard fall risk    WEIGHT BEARING RESTRICTION  Weight Bearing Restriction: L lower extremity           L Lower Extremity: Weight Bearing as Tolerated    PAIN ASSESSMENT  Ratin          COGNITION  Overall Cognitive Status:  WFL - within functional limits    RANGE OF MOTION AND STRENGTH ASSESSMENT    Lower extremity ROM is within functional limits  RLE WNL    Lower extremity strength is within functional limits  RLE WNL    BALANCE  Static Sitting: Good  Dynamic Sitting: Fair +  Static Standing: Fair  Dynamic Standing: Fair -    AM-PAC '6-Clicks' INPATIENT SHORT FORM - BASIC MOBILITY  How much difficulty does the patient currently have. .. Patient Difficulty: Turning over in bed (including adjusting bedclothes, sheets and blankets)?: None   Patient Difficulty: Sitting down on and standing up from a chair with arms (e.g., wheelchair, bedside commode, etc.): None   Patient Difficulty: Moving from lying on back to sitting on the side of the bed?: None   How much help from another person does the patient currently need. ..    Help from Another: Moving to and from a bed to a chair (including a wheelchair)?: None   Help from Another: Need to walk in hospital room?: A Little   Help from Another: Climbing 3-5 steps with a railing?: A Little     AM-PAC Score:  Raw Score: 22   Approx Degree of Impairment: 20.91%   Standardized Score (AM-PAC Scale): 53.28   CMS Modifier (G-Code): SALMA    Exercise/Education Provided:  Bed mobility  Body mechanics  Gait training  ROM  Strengthening  Lower therapeutic exercise: Ankle pumps  Heel slides  LAQ  Quad sets  Transfer training    Patient End of Session: Up in chair;Call light within reach; Needs met;RN aware of session/findings; All patient questions and concerns addressed; Family present     Patient Evaluation Complexity Level:  History Low - no personal factors and/or co-morbidities   Examination of body systems Low - addressing 1-2 elements   Clinical Presentation Low - Stable   Clinical Decision Making Low Complexity     Gait Training: 15 minutes  Therapeutic Activity: 23 minutes

## 2023-08-04 NOTE — CM/SW NOTE
SW received MDO surgery    SW performed chart review and confirmed pt has Outpatient PT in the home arranged by Dr. Dano To. No further needs at this time     PLAN: Home with Outpatient PT in the Home arranged by Ortho MD Merly Joshi, LSW, MSW ext.  [phone]

## 2023-08-04 NOTE — PLAN OF CARE
Problem: Patient Centered Care  Goal: Patient preferences are identified and integrated in the patient's plan of care  Description: Interventions:  - What would you like us to know as we care for you?   - Provide timely, complete, and accurate information to patient/family  - Incorporate patient and family knowledge, values, beliefs, and cultural backgrounds into the planning and delivery of care  - Encourage patient/family to participate in care and decision-making at the level they choose  - Honor patient and family perspectives and choices  Outcome: Progressing     Problem: Patient/Family Goals  Goal: Patient/Family Long Term Goal  Description: Patient's Long Term Goal:     Interventions:  - Pain medication  - PT/OT  - See additional Care Plan goals for specific interventions  Outcome: Progressing  Goal: Patient/Family Short Term Goal  Description: Patient's Short Term Goal:     Interventions:   - PT  - Pain medication  - Monitor labs  - See additional Care Plan goals for specific interventions  Outcome: Progressing     Problem: PAIN - ADULT  Goal: Verbalizes/displays adequate comfort level or patient's stated pain goal  Description: INTERVENTIONS:  - Encourage pt to monitor pain and request assistance  - Assess pain using appropriate pain scale  - Administer analgesics based on type and severity of pain and evaluate response  - Implement non-pharmacological measures as appropriate and evaluate response  - Consider cultural and social influences on pain and pain management  - Manage/alleviate anxiety  - Utilize distraction and/or relaxation techniques  - Monitor for opioid side effects  - Notify MD/LIP if interventions unsuccessful or patient reports new pain  - Anticipate increased pain with activity and pre-medicate as appropriate  Outcome: Progressing     Problem: SAFETY ADULT - FALL  Goal: Free from fall injury  Description: INTERVENTIONS:  - Assess pt frequently for physical needs  - Identify cognitive and physical deficits and behaviors that affect risk of falls.   - [place] fall precautions as indicated by assessment.  - Educate pt/family on patient safety including physical limitations  - Instruct pt to call for assistance with activity based on assessment  - Modify environment to reduce risk of injury  - Provide assistive devices as appropriate  - Consider OT/PT consult to assist with strengthening/mobility  - Encourage toileting schedule  Outcome: Progressing     Problem: DISCHARGE PLANNING  Goal: Discharge to home or other facility with appropriate resources  Description: INTERVENTIONS:  - Identify barriers to discharge w/pt and caregiver  - Include patient/family/discharge partner in discharge planning  - Arrange for needed discharge resources and transportation as appropriate  - Identify discharge learning needs (meds, wound care, etc)  - Arrange for interpreters to assist at discharge as needed  - Consider post-discharge preferences of patient/family/discharge partner  - Complete POLST form as appropriate  - Assess patient's ability to be responsible for managing their own health  - Refer to Case Management Department for coordinating discharge planning if the patient needs post-hospital services based on physician/LIP order or complex needs related to functional status, cognitive ability or social support system  Outcome: Progressing     Problem: SKIN/TISSUE INTEGRITY - ADULT  Goal: Incision(s), wounds(s) or drain site(s) healing without S/S of infection  Description: INTERVENTIONS:  - Assess and document risk factors for pressure ulcer development  - Assess and document skin integrity  - Assess and document dressing/incision, wound bed, drain sites and surrounding tissue  - Implement wound care per orders  - Initiate isolation precautions as appropriate  - Initiate Pressure Ulcer prevention bundle as indicated  Outcome: Progressing     Uzma García was resting in bed, able to ambulate x 1 assist and a  walker to the bathroom and voiding freely. Her pain was managed well with scheduled meds, PRN Oxy and IV Dilaudid for breakthrough pain. Spouse at bedside. [facility] in [facility] morning.

## 2023-08-04 NOTE — DISCHARGE INSTRUCTIONS
DISCHARGE INSTRUCTIONS:  PLACE ICE TO SURGICAL AREA 20-30 MINUTES ON/ 20-30 MINUTES OFF. THIS IS TO HELP WITH PAIN & SWELLING. TAKE YOUR MEDICATIONS BELOW AS PRESCRIBED BY YOUR DOCTOR. THESE HAVE BEEN SENT TO YOUR PHARMACY. IT IS OK TO BEAR WEIGHT AS TOLERATED ON THE OPERATIVE EXTREMITY. CALL TO CONFIRM YOUR FOLLOW UP APPOINTMENT WITH DR. Rosina Gerard TO BE SEEN IN 2-3 WEEKS POSTOP. [phone]    MEDICATIONS:    POST OP MEDICATION REGIMEN              MULTI-MODAL   PAIN REGIMEN       DRUG   FOR   FREQUENCY & DURATION   QTY   NOTES     WEANING  TIPS       Gabapentin 100mg   Nerve pain   Take 2 tabs every 8 hours for 14 days    90   No refills You may discontinue this medication prior to 14 days if you do not tolerate it. Tylenol 500mg     Mild pain   Take 2 tabs every 6 hours     90   Can purchase over-the-counter    Stop using medication if no longer having pain. Tramadol 50mg     Moderate pain   Take 1-2 tabs every 6 hours only as needed   45 May prescribe a refill, but ideally, this should be tapered off after surgery Stop using this medication 2nd   As pain decreases over time, increase the interval between doses (6 hours to 8, then 12, then 24) to taper off the medication. Meloxicam 15mg     Inflammation   Take 1 tab daily for 30 days     30   May refill if needed beyond 30 days   Recommend completing the 30 day supply. BREAKTHROUGH PAIN         Oxycodone 5mg       Severe pain     Take 1-2 tabs every 4-6 hours only as needed for severe pain       40   Take at least 1 hr apart from Tramadol. Ideally, no refill. The goal is to taper off this medication as soon as possible, as it can be addictive and have side effects. Stop using this medication 1st if no longer having severe pain. BLOOD THINNER     Aspirin 325mg   Blood clot prevention   Take 1 tab twice daily for 30 days     60     No refills   Complete the entire course of your blood thinner.          ANTIBIOTIC Cefadroxil 500mg       Infection prevention     Take 1 tab twice daily for 7 days     14     No refills   Complete the entire course of your prophylactic antibiotic. STOOL SOFTENER     Sennokot 8.6/50mg   Constipation   Take 1 tab twice daily  while on opioids     60 Refer to discharge instructions if constipation persists even after taking this medication   Stop taking if having diarrhea or loose stools. ANTI-NAUSEA   Ondansetron 4mg   Nausea   Take 1 tab every 8 hours as needed if nauseous     20   No Refill      ANTI-ACID REFLUX / GASTRITIS   Pantoprazole 40mg   Acid reflux, gastric ulcer prophylaxis   Take 1 tab every day along with Meloxicam     60   May refill if Meloxicam refilled          DRESSING:    YOU MAY REMOVE ACE BANDAGE AND COTTON WRAP 5 DAYS AFTER SURGERY    YOU HAVE A SPECIAL DRESSING CALLED A PREVENA DRESSING, OVER YOUR INCISION. THIS IS A TYPE OF NEGATIVE PRESSURE DRESSING THAT KEEPS THE WOUND DRY. IT IS CONNECTED TO A CANNISTER. MAKE SURE THAT THE CANNISTER IS POWERED ON AND NOT OUT OF BATTERY. THE DRESSING STAYS FOR 7 DAYS FROM SURGERY. THIS DRESSING SHOULD BE CHANGED TO AN AQUACEL AT 7 DAYS POST-OPERATIVELY. IF THE PREVENA DRESSING HAS CONTINUOUS AND PERSISTENT DRAINAGE, CALL YOUR SURGEON FIRST BEFORE CHANGING THE DRESSING TO AN AQUACEL DRESSING. YOU MAY SHOWER AS SOON AS THE AQUACEL DRESSING IS PLACED INSTEAD OF THE PREVENA DRESSING OVER YOUR INCISION AREA. IF THE DRESSING LEAKS OR COMES LOOSE BEFORE THE 7 DAY PERIOD CONTACT YOUR DOCTOR / SURGEON. AFTER   14 DAYS POST OPERATIVELY, THE AQUACEL DRESSING IS REMOVED BY PULLING ON THE EDGE OF THE DRESSING AND GENTLY STRETCHING IT, THEN PEEL IT OFF SLOWLY LIKE A BANDAID. IF YOU HAVE ANY QUESTIONS OR CONCERNS ABOUT THE DRESSING CONTACT YOUR DOCTOR. REASONS TO CALL YOUR DOCTOR:  TEMPERATURE .0 OR MORE  PERSISTANT NAUSEA OR VOMITTING - ESPECIALLY IF YOU ARE UNABLE TO EAT OR DRINK.   INCREASED LEVEL OF PAIN OR PAIN WHICH IS NOT CONTROLLED BY YOUR PAIN MEDICINE. PERSISTENT DRAINAGE AT ANYTIME FROM YOUR SURGICAL AREA / INCISION. PAIN, TENDERNESS OR SUDDEN SWELLING IN YOUR CALF REGION OF THE LOWER EXTREMITIES - THIS IS A POSSIBLE SIGN OF A BLOOD CLOT. ANY CHANGES IN SENSATION IN YOUR BODY IN THE AREA OF YOUR SURGERY = NUMBNESS OR TINGLING. ANY CHANGES IN CIRCULATION IN YOUR BODY IN THE AREA OF YOUR SURGERY = CHANGES  IN COLOR EITHER DARKER OR VERY PALE; OR  IF AREA FEELS COLD TO THE TOUCH AS COMPARED TO OTHER AREAS. ANY CHANGES IN FUNCTION IN YOUR BODY IN THE AREA OF YOUR SURGERY = CHANGE IN MOVEMENT - UNABLE TO MOVE OR WIGGLE FINGERS, TOES OR FOOT OR ANY OTHER CHANGES IN NORMAL BODY FUNCTIONING. FOR ANY OF THE ABOVE OR ANY OTHER QUESTIONS OR CONCERNS CALL YOUR DOCTOR/ SURGEON AS SOON AS POSSIBLE.     Yann Beasley MD MPH  Orthopaedic Surgeon, Adult Hip and Knee Reconstruction  [facility] at [facility] [address]  [facility], [address] [place] [address]  Office: (M) [phone] (M) [phone]  Adminstrative Assistant: Do Graham

## 2023-08-04 NOTE — ANESTHESIA POSTPROCEDURE EVALUATION
Patient: Tereza Jolley    Procedure Summary       Date: 08/03/23 Room / Location: [facility] OR  / [facility] OR    Anesthesia Start: 6688 Anesthesia Stop:     Procedure: Left revision total knee arthroplasty (poly exchange) (Left: Knee) Diagnosis: (Left knee mechanical complication (loosening))    Surgeons: Judy Hobbs MD Anesthesiologist: Ronna Joe MD    Anesthesia Type: general ASA Status: 2            Anesthesia Type: general    Vitals Value Taken Time   /58 08/03/23 2019   Temp 98.7 08/03/23 2019   Pulse 65 08/03/23 2019   Resp 12 08/03/23 2019   SpO2 97 % 08/03/23 2019   Vitals shown include unvalidated device data.     [facility] Post Evaluation:   Patient Evaluated in PACU  Patient Participation: complete - patient participated  Level of Consciousness: awake  Pain Management: adequate  Airway Patency:patent  Dental exam unchanged from preop  Yes    Cardiovascular Status: acceptable  Respiratory Status: acceptable  Postoperative Hydration acceptable      Augusta Perkins MD  8/3/2023 8:19 PM

## 2023-08-05 PROBLEM — Z98.890 S/P KNEE SURGERY: Status: ACTIVE | Noted: 2023-08-05

## 2024-07-23 ENCOUNTER — TELEPHONE (OUTPATIENT)
Dept: FAMILY MEDICINE CLINIC | Facility: CLINIC | Age: 69
End: 2024-07-23

## 2024-07-23 RX ORDER — PREGABALIN 75 MG/1
75 CAPSULE ORAL AS NEEDED
COMMUNITY

## 2024-07-24 ENCOUNTER — LAB ENCOUNTER (OUTPATIENT)
Dept: LAB | Facility: HOSPITAL | Age: 69
End: 2024-07-24
Attending: FAMILY MEDICINE
Payer: MEDICARE

## 2024-07-24 ENCOUNTER — HOSPITAL ENCOUNTER (OUTPATIENT)
Dept: GENERAL RADIOLOGY | Facility: HOSPITAL | Age: 69
Discharge: HOME | End: 2024-07-24
Attending: FAMILY MEDICINE
Payer: MEDICARE

## 2024-07-24 ENCOUNTER — OFFICE VISIT (OUTPATIENT)
Dept: FAMILY MEDICINE CLINIC | Facility: CLINIC | Age: 69
End: 2024-07-24
Payer: MEDICARE

## 2024-07-24 VITALS
SYSTOLIC BLOOD PRESSURE: 116 MMHG | HEIGHT: 67 IN | RESPIRATION RATE: 16 BRPM | DIASTOLIC BLOOD PRESSURE: 72 MMHG | BODY MASS INDEX: 39.24 KG/M2 | TEMPERATURE: 97 F | WEIGHT: 250 LBS | OXYGEN SATURATION: 97 % | HEART RATE: 61 BPM

## 2024-07-24 DIAGNOSIS — R73.01 ELEVATED FASTING BLOOD SUGAR: ICD-10-CM

## 2024-07-24 DIAGNOSIS — R06.02 SHORTNESS OF BREATH: ICD-10-CM

## 2024-07-24 DIAGNOSIS — E66.09 CLASS 2 OBESITY DUE TO EXCESS CALORIES WITHOUT SERIOUS COMORBIDITY WITH BODY MASS INDEX (BMI) OF 39.0 TO 39.9 IN ADULT: ICD-10-CM

## 2024-07-24 DIAGNOSIS — Z01.818 PREOP EXAMINATION: ICD-10-CM

## 2024-07-24 DIAGNOSIS — Z01.812 PRE-OPERATIVE LABORATORY EXAMINATION: ICD-10-CM

## 2024-07-24 DIAGNOSIS — Z01.812 PRE-OPERATIVE LABORATORY EXAMINATION: Primary | ICD-10-CM

## 2024-07-24 DIAGNOSIS — E03.8 OTHER SPECIFIED HYPOTHYROIDISM: ICD-10-CM

## 2024-07-24 DIAGNOSIS — Z01.818 PREOP TESTING: ICD-10-CM

## 2024-07-24 DIAGNOSIS — I10 ESSENTIAL HYPERTENSION: ICD-10-CM

## 2024-07-24 DIAGNOSIS — M16.11 ARTHRITIS OF RIGHT HIP: ICD-10-CM

## 2024-07-24 PROBLEM — E66.01 MORBID OBESITY WITH BMI OF 40.0-44.9, ADULT (HCC): Status: RESOLVED | Noted: 2023-08-03 | Resolved: 2024-07-24

## 2024-07-24 PROBLEM — E66.812 CLASS 2 OBESITY DUE TO EXCESS CALORIES WITHOUT SERIOUS COMORBIDITY WITH BODY MASS INDEX (BMI) OF 39.0 TO 39.9 IN ADULT: Status: ACTIVE | Noted: 2024-07-24

## 2024-07-24 LAB
ALBUMIN SERPL-MCNC: 5.1 G/DL (ref 3.2–4.8)
ALBUMIN/GLOB SERPL: 1.8 {RATIO} (ref 1–2)
ALP LIVER SERPL-CCNC: 111 U/L
ALT SERPL-CCNC: 32 U/L
ANION GAP SERPL CALC-SCNC: 4 MMOL/L (ref 0–18)
ANTIBODY SCREEN: NEGATIVE
APTT PPP: 26.5 SECONDS (ref 23–36)
AST SERPL-CCNC: 26 U/L (ref ?–34)
ATRIAL RATE: 57 BPM
BASOPHILS # BLD AUTO: 0.03 X10(3) UL (ref 0–0.2)
BASOPHILS NFR BLD AUTO: 0.5 %
BILIRUB SERPL-MCNC: 0.6 MG/DL (ref 0.2–1.1)
BILIRUB UR QL: NEGATIVE
BUN BLD-MCNC: 14 MG/DL (ref 9–23)
BUN/CREAT SERPL: 16.9 (ref 10–20)
CALCIUM BLD-MCNC: 10.2 MG/DL (ref 8.7–10.4)
CHLORIDE SERPL-SCNC: 103 MMOL/L (ref 98–112)
CLARITY UR: CLEAR
CO2 SERPL-SCNC: 30 MMOL/L (ref 21–32)
CREAT BLD-MCNC: 0.83 MG/DL
DEPRECATED RDW RBC AUTO: 41.1 FL (ref 35.1–46.3)
EGFRCR SERPLBLD CKD-EPI 2021: 77 ML/MIN/1.73M2 (ref 60–?)
EOSINOPHIL # BLD AUTO: 0.02 X10(3) UL (ref 0–0.7)
EOSINOPHIL NFR BLD AUTO: 0.4 %
ERYTHROCYTE [DISTWIDTH] IN BLOOD BY AUTOMATED COUNT: 12 % (ref 11–15)
EST. AVERAGE GLUCOSE BLD GHB EST-MCNC: 103 MG/DL (ref 68–126)
FASTING STATUS PATIENT QL REPORTED: NO
GLOBULIN PLAS-MCNC: 2.8 G/DL (ref 2–3.5)
GLUCOSE BLD-MCNC: 98 MG/DL (ref 70–99)
GLUCOSE UR-MCNC: NORMAL MG/DL
HBA1C MFR BLD: 5.2 % (ref ?–5.7)
HCT VFR BLD AUTO: 44.4 %
HGB BLD-MCNC: 15 G/DL
HGB UR QL STRIP.AUTO: NEGATIVE
IMM GRANULOCYTES # BLD AUTO: 0.09 X10(3) UL (ref 0–1)
IMM GRANULOCYTES NFR BLD: 1.6 %
INR BLD: 0.9 (ref 0.8–1.2)
KETONES UR-MCNC: NEGATIVE MG/DL
LEUKOCYTE ESTERASE UR QL STRIP.AUTO: 250
LYMPHOCYTES # BLD AUTO: 1.92 X10(3) UL (ref 1–4)
LYMPHOCYTES NFR BLD AUTO: 34.1 %
MCH RBC QN AUTO: 31.3 PG (ref 26–34)
MCHC RBC AUTO-ENTMCNC: 33.8 G/DL (ref 31–37)
MCV RBC AUTO: 92.7 FL
MONOCYTES # BLD AUTO: 0.61 X10(3) UL (ref 0.1–1)
MONOCYTES NFR BLD AUTO: 10.8 %
NEUTROPHILS # BLD AUTO: 2.96 X10 (3) UL (ref 1.5–7.7)
NEUTROPHILS # BLD AUTO: 2.96 X10(3) UL (ref 1.5–7.7)
NEUTROPHILS NFR BLD AUTO: 52.6 %
NITRITE UR QL STRIP.AUTO: NEGATIVE
OSMOLALITY SERPL CALC.SUM OF ELEC: 284 MOSM/KG (ref 275–295)
P AXIS: 9 DEGREES
P-R INTERVAL: 160 MS
PH UR: 5.5 [PH] (ref 5–8)
PLATELET # BLD AUTO: 152 10(3)UL (ref 150–450)
POTASSIUM SERPL-SCNC: 4.7 MMOL/L (ref 3.5–5.1)
PROT SERPL-MCNC: 7.9 G/DL (ref 5.7–8.2)
PROT UR-MCNC: NEGATIVE MG/DL
PROTHROMBIN TIME: 12.7 SECONDS (ref 11.6–14.8)
Q-T INTERVAL: 414 MS
QRS DURATION: 90 MS
QTC CALCULATION (BEZET): 402 MS
R AXIS: 44 DEGREES
RBC # BLD AUTO: 4.79 X10(6)UL
RH BLOOD TYPE: POSITIVE
SODIUM SERPL-SCNC: 137 MMOL/L (ref 136–145)
SP GR UR STRIP: 1.02 (ref 1–1.03)
T AXIS: 42 DEGREES
UROBILINOGEN UR STRIP-ACNC: NORMAL
VENTRICULAR RATE: 57 BPM
WBC # BLD AUTO: 5.6 X10(3) UL (ref 4–11)

## 2024-07-24 PROCEDURE — 86901 BLOOD TYPING SEROLOGIC RH(D): CPT

## 2024-07-24 PROCEDURE — 87086 URINE CULTURE/COLONY COUNT: CPT

## 2024-07-24 PROCEDURE — 86900 BLOOD TYPING SEROLOGIC ABO: CPT

## 2024-07-24 PROCEDURE — 83036 HEMOGLOBIN GLYCOSYLATED A1C: CPT

## 2024-07-24 PROCEDURE — 93010 ELECTROCARDIOGRAM REPORT: CPT | Performed by: INTERNAL MEDICINE

## 2024-07-24 PROCEDURE — 85730 THROMBOPLASTIN TIME PARTIAL: CPT

## 2024-07-24 PROCEDURE — 99203 OFFICE O/P NEW LOW 30 MIN: CPT | Performed by: FAMILY MEDICINE

## 2024-07-24 PROCEDURE — 85610 PROTHROMBIN TIME: CPT

## 2024-07-24 PROCEDURE — 85025 COMPLETE CBC W/AUTO DIFF WBC: CPT

## 2024-07-24 PROCEDURE — 93005 ELECTROCARDIOGRAM TRACING: CPT

## 2024-07-24 PROCEDURE — 87081 CULTURE SCREEN ONLY: CPT

## 2024-07-24 PROCEDURE — 81001 URINALYSIS AUTO W/SCOPE: CPT

## 2024-07-24 PROCEDURE — 71046 X-RAY EXAM CHEST 2 VIEWS: CPT | Performed by: FAMILY MEDICINE

## 2024-07-24 PROCEDURE — 87147 CULTURE TYPE IMMUNOLOGIC: CPT

## 2024-07-24 PROCEDURE — 86850 RBC ANTIBODY SCREEN: CPT

## 2024-07-24 PROCEDURE — 36415 COLL VENOUS BLD VENIPUNCTURE: CPT

## 2024-07-24 PROCEDURE — 80053 COMPREHEN METABOLIC PANEL: CPT

## 2024-07-24 NOTE — TELEPHONE ENCOUNTER
RTHA (anterior) on 08/02/24 with Dr. Behery @ [facility]    H&P- completed 07/24/24  Labs- albumin (5.1), MRSA neg, urine culture showed 10,000-50,000 CFU/ML Group B beta strep, all other labs WNL  EKG- sinus bradycardia, low voltage QRS, consider pulmonary disease, pericardial effusion,or normal variant. When compared with EKG of 07/26/23 no significant change was found.  X-ray- WNL

## 2024-07-25 NOTE — H&P (VIEW-ONLY)
[facility] PRE-OP CLINIC [place]    PRE-OP NOTE    HPI:   I have been consulted by Dr. Behery to see Teodora Burns 68 year old female for a preoperative evaluation and medical clearance. Teodora has a long history of worsening severe degenerative arthritis right hip. Patient is to have right JOHN by Dr. Behery 24.     Pt suffers significant pain and loss of function.     No cardiopulmonary symptoms.      No history of WILLIAM or DVT. Denies tobacco use.       Family History   Problem Relation Age of Onset    Other (lung cancer) Father 73    Dementia Mother 80    Other (Other) Sister         factor v leiden    Other (factor V leiden) Brother       Current Outpatient Medications   Medication Sig Dispense Refill    metFORMIN 500 MG Oral Tab Take 1 tablet (500 mg total) by mouth 2 (two) times daily with meals. 60 tablet 2    pregabalin 75 MG Oral Cap Take 1 capsule (75 mg total) by mouth as needed.      ALPRAZolam 0.5 MG Oral Tab Take 1 tablet (0.5 mg total) by mouth as needed.      spironolactone 100 MG Oral Tab Take 1 tablet (100 mg total) by mouth daily.  7    Omega-3 1000 MG Oral Cap Take 1 capsule by mouth daily.      Levothyroxine Sodium 150 MCG Oral Tab Take 0.137 tablets by mouth daily.  6    furosemide 40 MG Oral Tab Take 1 tablet (40 mg total) by mouth as needed.  6    Cholecalciferol (VITAMIN D OR) Take 1 capsule by mouth daily.      BuPROPion HCl ER, XL, 300 MG Oral Tablet 24 Hr Take 1 tablet (300 mg total) by mouth daily.       Past Medical History:    Anxiety state    Back problem    cervical, thoracic, and lumbar    Hypothyroidism    Migraines    Osteoarthritis    Pneumonia due to organism    Visual impairment    glasses     Past Surgical History:   Procedure Laterality Date    Anesth, section      Carpal tunnel release  2017    R hand    Cataract Bilateral     Foot surgery Bilateral     Buinon removel - repair of joint between two toes Both feet    Hip arthroplasty Left 2000    Knee  replacement surgery  2010    Bilateral    Laparoscopic cholecystectomy  2010    Other surgical history  2018    R shoulder Bicep tendon reattachment, supurior capsular reconstrction, L shoulder reconstruction    Other surgical history  2010    Nose reset    Other surgical history  2010    3 x Liver stone    Rotator cuff repair Left 2022    Tonsillectomy  1978     Social History     Socioeconomic History    Marital status:      Spouse name: Not on file    Number of children: Not on file    Years of education: Not on file    Highest education level: Not on file   Occupational History    Not on file   Tobacco Use    Smoking status: Never     Passive exposure: Past    Smokeless tobacco: Never   Vaping Use    Vaping status: Never Used   Substance and Sexual Activity    Alcohol use: Yes     Comment: 1-2 weekly    Drug use: No    Sexual activity: Not on file   Other Topics Concern    Not on file   Social History Narrative    Not on file     Social Determinants of Health     Financial Resource Strain: Not on file   Food Insecurity: Not on file   Transportation Needs: Not on file   Physical Activity: Not on file   Stress: Not on file   Social Connections: Unknown (3/15/2021)    Received from [facility], [facility]    Social Connections     Conversations with friends/family/neighbors per week: Not on file   Housing Stability: Low Risk  (7/9/2021)    Received from [facility], [facility]    Housing Stability     Mortgage Payment Concerns?: Not on file     Number of Places Lived in the Last Year: Not on file     Unstable Housing?: Not on file       REVIEW OF SYSTEMS:   CONSTITUTIONAL:  Denies unusual weight gain/loss, fever, chills  EENT:  Eyes:  Denies eye pain, visual loss, blurred vision, double vision. Ears, Nose, Throat:  Denies congestion, runny nose or sore throat.  INTEGUMENTARY:  Denies rashes, itching, skin lesion,   CARDIOVASCULAR:   Denies DVT. Denies chest pain, palpitations, edema, dyspnea  RESPIRATORY: no WILLIAM ,Denies shortness of breath, wheezing, cough  GASTROINTESTINAL:  Denies abdominal pain, nausea, vomiting, constipation, diarrhea, or blood in stool.  MUSCULOSKELETAL: severe pain as noted above  NEUROLOGICAL:  Denies headache, seizures, dizziness, syncope, paralysis, ataxia,  HEMATOLOGIC:  Denies anemia, bleeding or bruising.  LYMPHATICS:  Denies enlarged nodes   PSYCHIATRIC:  Denies depression or anxiety.  ENDOCRINOLOGIC: DM 2 no,   ALLERGIES:  Denies allergic response, history of asthma, hives,     EXAM:   /72 (BP Location: Left arm)   Pulse 61   Temp 97 °F (36.1 °C) (Temporal)   Resp 16   Ht 5' 7\" (1.702 m)   Wt 250 lb (113.4 kg)   SpO2 97%   BMI 39.16 kg/m²  Estimated body mass index is 39.16 kg/m² as calculated from the following:    Height as of this encounter: 5' 7\" (1.702 m).    Weight as of this encounter: 250 lb (113.4 kg).   Vital signs reviewed.Appears stated age, well groomed.  Physical Exam:  GEN:  Patient is alert, awake and oriented, well developed, well nourished, no apparent distress.  HEENT:  Head:  Normocephalic, atraumatic Eyes: EOMI,no scleral icterus, conjunctivae clear bilaterally, no eye discharge Nose: patent, no nasal discharge  NECK: Supple, no CLAD, no carotid bruit, no thyromegaly.  SKIN: No rashes, no skin lesion, no bruising, good turgor.  HEART:  Regular rate and rhythm, no murmurs, rubs or gallops.  LUNGS: Clear to auscultation bilterally, no rales/rhonchi/wheezing.  CHEST: No tenderness.  ABDOMEN:  Soft, nondistended, nontender,  no masses, no hepatosplenomegaly.  BACK: No tenderness, no spasm,   EXTREMITIES:  No edema, no cyanosis, no clubbing,   NEURO:  No focal deficit, speech fluent, normal gait, strength and tone, sensory intact      Lab Results   Component Value Date    WBC 5.6 07/24/2024    HGB 15.0 07/24/2024    HCT 44.4 07/24/2024    .0 07/24/2024    CREATSERUM 0.83  07/24/2024    BUN 14 07/24/2024     07/24/2024    K 4.7 07/24/2024     07/24/2024    CO2 30.0 07/24/2024    GLU 98 07/24/2024    CA 10.2 07/24/2024    ALB 5.1 (H) 07/24/2024    ALKPHO 111 07/24/2024    BILT 0.6 07/24/2024    TP 7.9 07/24/2024    AST 26 07/24/2024    ALT 32 07/24/2024    PTT 26.5 07/24/2024    INR 0.90 07/24/2024       XR CHEST PA + LAT CHEST (CPT=71046)    Result Date: 7/24/2024  CONCLUSION:   No focal opacity, pleural effusion, or pneumothorax.    Dictated by (CST): Vamsi Aceves MD on 7/24/2024 at 2:18 PM     Finalized by (CST): Vamsi Aceves MD on 7/24/2024 at 2:19 PM           EKG 12 Lead    Result Date: 7/24/2024  Sinus bradycardia Low voltage QRS, consider pulmonary disease, pericardial effusion, or normal variant Borderline ECG When compared with ECG of 26-JUL-2023 08:04, No significant change was found Confirmed by Papi Long ([id number]) on 7/24/2024 2:00:38 PM     ASSESSMENT AND PLAN:   Teodora Burns is a 68 year old female, with a hx of severe degenerative arthritis right hip who presents for a pre-operative physical exam. Patient is to have right JOHN by Dr. Behery on 8/2/24 at [facility].     Arthritis of right hip  M16.11        Other specified hypothyroidism  E03.8        Essential hypertension  I10        Class 2 obesity due to excess calories without serious comorbidity with body mass index (BMI) of 39.0 to 39.9 in adult  E66.09     Z68.39        Elevated fasting blood sugar  R73.01 Hemoglobin A1C         ECG and labs have been reviewed and are in acceptable range for surgery.     Preoperative Risk Stratification: There are no decompensated medical conditions. ASA classification 2    Patient has an RCRI score that is low risk for major cardiac event in the perioperative period.     Patient is medically optimized and has an acceptable risk of surgery and may proceed with surgery as planned.     PLAN:    Patient to discontinue medications and supplements with anticoagulation  properties as per instruction. Pt requested refill of metformin and pcp is gone. Refill given      Postoperative Recommendations:    Anticoagulation / DVT prophylaxis: SCDs, early ambulation. ASA 81 mg twice daily with food for four weeks.    GI protection: Protonix  Incentive Spirometry   Telemetry as needed  CPAP/O2 as needed   DM: QID glucoscans and sliding scale insulin as needed       Pain management and Physical therapy as per Orthopedic service.   Home Health as needed    Thank you for the opportunity to care for your patient and to assist in managing the postoperative course.        Pawan Delarosa MD  7/24/2024  7:14 PM

## 2024-07-25 NOTE — TELEPHONE ENCOUNTER
Recommend treating UTI with cephadroxil 500 mg bid for 10 days.     The remainder of preoperative testing and evaluation were reviewed and are acceptable for surgery. Pt is medically clear to proceed with surgery as planned.

## 2024-07-25 NOTE — TELEPHONE ENCOUNTER
Dr. Delarosa, please review:    Labs- albumin (5.1), MRSA neg, urine culture showed 10,000-50,000 CFU/ML Group B beta strep, all other labs WNL    EKG- sinus bradycardia, low voltage QRS, consider pulmonary disease, pericardial effusion,or normal variant. When compared with EKG of 07/26/23 no significant change was found.    X-ray- WNL    Do you want to treat Group B strep in urine?    OK for surgery?

## 2024-07-25 NOTE — H&P
[facility] PRE-OP [facility]IN[facility] [place]    PRE-OP NOTE    HPI:   I have been consulted by Dr. Behery to see Teodora Burns 68 year old female for a preoperative evaluation and medical clearance. Teodora has a long history of worsening severe degenerative arthritis right hip. Patient is to have right JOHN by Dr. Behery 24.     Pt suffers significant pain and loss of function.     No cardiopulmonary symptoms.      No history of WILLIAM or DVT. Denies tobacco use.       Family History   Problem Relation Age of Onset    Other (lung cancer) Father 73    Dementia Mother 80    Other (Other) Sister         factor v leiden    Other (factor V leiden) Brother       Current Outpatient Medications   Medication Sig Dispense Refill    metFORMIN 500 MG Oral Tab Take 1 tablet (500 mg total) by mouth 2 (two) times daily with meals. 60 tablet 2    pregabalin 75 MG Oral Cap Take 1 capsule (75 mg total) by mouth as needed.      ALPRAZolam 0.5 MG Oral Tab Take 1 tablet (0.5 mg total) by mouth as needed.      spironolactone 100 MG Oral Tab Take 1 tablet (100 mg total) by mouth daily.  7    Omega-3 1000 MG Oral Cap Take 1 capsule by mouth daily.      Levothyroxine Sodium 150 MCG Oral Tab Take 0.137 tablets by mouth daily.  6    furosemide 40 MG Oral Tab Take 1 tablet (40 mg total) by mouth as needed.  6    Cholecalciferol (VITAMIN D OR) Take 1 capsule by mouth daily.      BuPROPion HCl ER, XL, 300 MG Oral Tablet 24 Hr Take 1 tablet (300 mg total) by mouth daily.       Past Medical History:    Anxiety state    Back problem    cervical, thoracic, and lumbar    Hypothyroidism    Migraines    Osteoarthritis    Pneumonia due to organism    Visual impairment    glasses     Past Surgical History:   Procedure Laterality Date    Anesth, section      Carpal tunnel release  2017    R hand    Cataract Bilateral     Foot surgery Bilateral     Buinon removel - repair of joint between two toes Both feet    Hip arthroplasty Left 2000    Knee  replacement surgery  2010    Bilateral    Laparoscopic cholecystectomy  2010    Other surgical history  2018    R shoulder Bicep tendon reattachment, supurior capsular reconstrction, L shoulder reconstruction    Other surgical history  2010    Nose reset    Other surgical history  2010    3 x Liver stone    Rotator cuff repair Left 2022    Tonsillectomy  1978     Social History     Socioeconomic History    Marital status:      Spouse name: Not on file    Number of children: Not on file    Years of education: Not on file    Highest education level: Not on file   Occupational History    Not on file   Tobacco Use    Smoking status: Never     Passive exposure: Past    Smokeless tobacco: Never   Vaping Use    Vaping status: Never Used   Substance and Sexual Activity    Alcohol use: Yes     Comment: 1-2 weekly    Drug use: No    Sexual activity: Not on file   Other Topics Concern    Not on file   Social History Narrative    Not on file     Social Determinants of Health     Financial Resource Strain: Not on file   Food Insecurity: Not on file   Transportation Needs: Not on file   Physical Activity: Not on file   Stress: Not on file   Social Connections: Unknown (3/15/2021)    [facility], [facility]    Social Connections     Conversations with friends/family/neighbors per week: Not on file   Housing Stability: Low Risk  (7/9/2021)    Received from [facility], [facility]    Housing Stability     Mortgage Payment Concerns?: Not on file     Number of Places Lived in the Last Year: Not on file     Unstable Housing?: Not on file       REVIEW OF SYSTEMS:   CONSTITUTIONAL:  Denies unusual weight gain/loss, fever, chills  EENT:  Eyes:  Denies eye pain, visual loss, blurred vision, double vision. Ears, Nose, Throat:  Denies congestion, runny nose or sore throat.  INTEGUMENTARY:  Denies rashes, itching, skin lesion,   CARDIOVASCULAR:   Denies DVT. Denies chest pain, palpitations, edema, dyspnea  RESPIRATORY: no WILLIAM ,Denies shortness of breath, wheezing, cough  GASTROINTESTINAL:  Denies abdominal pain, nausea, vomiting, constipation, diarrhea, or blood in stool.  MUSCULOSKELETAL: severe pain as noted above  NEUROLOGICAL:  Denies headache, seizures, dizziness, syncope, paralysis, ataxia,  HEMATOLOGIC:  Denies anemia, bleeding or bruising.  LYMPHATICS:  Denies enlarged nodes   PSYCHIATRIC:  Denies depression or anxiety.  ENDOCRINOLOGIC: DM 2 no,   ALLERGIES:  Denies allergic response, history of asthma, hives,     EXAM:   /72 (BP Location: Left arm)   Pulse 61   Temp 97 °F (36.1 °C) (Temporal)   Resp 16   Ht 5' 7\" (1.702 m)   Wt 250 lb (113.4 kg)   SpO2 97%   BMI 39.16 kg/m²  Estimated body mass index is 39.16 kg/m² as calculated from the following:    Height as of this encounter: 5' 7\" (1.702 m).    Weight as of this encounter: 250 lb (113.4 kg).   Vital signs reviewed.Appears stated age, well groomed.  Physical Exam:  GEN:  Patient is alert, awake and oriented, well developed, well nourished, no apparent distress.  HEENT:  Head:  Normocephalic, atraumatic Eyes: EOMI,no scleral icterus, conjunctivae clear bilaterally, no eye discharge Nose: patent, no nasal discharge  NECK: Supple, no CLAD, no carotid bruit, no thyromegaly.  SKIN: No rashes, no skin lesion, no bruising, good turgor.  HEART:  Regular rate and rhythm, no murmurs, rubs or gallops.  LUNGS: Clear to auscultation bilterally, no rales/rhonchi/wheezing.  CHEST: No tenderness.  ABDOMEN:  Soft, nondistended, nontender,  no masses, no hepatosplenomegaly.  BACK: No tenderness, no spasm,   EXTREMITIES:  No edema, no cyanosis, no clubbing,   NEURO:  No focal deficit, speech fluent, normal gait, strength and tone, sensory intact      Lab Results   Component Value Date    WBC 5.6 07/24/2024    HGB 15.0 07/24/2024    HCT 44.4 07/24/2024    .0 07/24/2024    CREATSERUM 0.83  07/24/2024    BUN 14 07/24/2024     07/24/2024    K 4.7 07/24/2024     07/24/2024    CO2 30.0 07/24/2024    GLU 98 07/24/2024    CA 10.2 07/24/2024    ALB 5.1 (H) 07/24/2024    ALKPHO 111 07/24/2024    BILT 0.6 07/24/2024    TP 7.9 07/24/2024    AST 26 07/24/2024    ALT 32 07/24/2024    PTT 26.5 07/24/2024    INR 0.90 07/24/2024       XR CHEST PA + LAT CHEST (CPT=71046)    Result Date: 7/24/2024  CONCLUSION:   No focal opacity, pleural effusion, or pneumothorax.    Dictated by (CST): Vamsi Aceves MD on 7/24/2024 at 2:18 PM     Finalized by (CST): Vamsi Aceves MD on 7/24/2024 at 2:19 PM           EKG 12 Lead    Result Date: 7/24/2024  Sinus bradycardia Low voltage QRS, consider pulmonary disease, pericardial effusion, or normal variant Borderline ECG When compared with ECG of 26-JUL-2023 08:04, No significant change was found Confirmed by Papi Long ([id number]) on 7/24/2024 2:00:38 PM     ASSESSMENT AND PLAN:   Teodora Burns is a 68 year old female, with a hx of severe degenerative arthritis right hip who presents for a pre-operative physical exam. Patient is to have right JOHN by Dr. Behery on 8/2/24 at [facility].     Arthritis of right hip  M16.11        Other specified hypothyroidism  E03.8        Essential hypertension  I10        Class 2 obesity due to excess calories without serious comorbidity with body mass index (BMI) of 39.0 to 39.9 in adult  E66.09     Z68.39        Elevated fasting blood sugar  R73.01 Hemoglobin A1C         ECG and labs have been reviewed and are in acceptable range for surgery.     Preoperative Risk Stratification: There are no decompensated medical conditions. ASA classification 2    Patient has an RCRI score that is low risk for major cardiac event in the perioperative period.     Patient is medically optimized and has an acceptable risk of surgery and may proceed with surgery as planned.     PLAN:    Patient to discontinue medications and supplements with anticoagulation  properties as per instruction. Pt requested refill of metformin and pcp is gone. Refill given      Postoperative Recommendations:    Anticoagulation / DVT prophylaxis: SCDs, early ambulation. ASA 81 mg twice daily with food for four weeks.    GI protection: Protonix  Incentive Spirometry   Telemetry as needed  CPAP/O2 as needed   DM: QID glucoscans and sliding scale insulin as needed       Pain management and Physical therapy as per Orthopedic service.   Home Health as needed    Thank you for the opportunity to care for your patient and to assist in managing the postoperative course.        Pawan Delarosa MD  7/24/2024  7:14 PM

## 2024-07-26 RX ORDER — CEFADROXIL 500 MG/1
500 CAPSULE ORAL 2 TIMES DAILY
Qty: 20 CAPSULE | Refills: 0 | Status: SHIPPED | OUTPATIENT
Start: 2024-07-26 | End: 2024-08-05

## 2024-07-26 RX ORDER — FLUCONAZOLE 150 MG/1
150 TABLET ORAL ONCE
Qty: 1 TABLET | Refills: 1 | Status: SHIPPED | OUTPATIENT
Start: 2024-07-26 | End: 2024-07-26

## 2024-07-26 NOTE — TELEPHONE ENCOUNTER
Spoke to patient, went over test results. She will  abx Cefadroxil 500mg BID x10 days, and Diflucan 150mg once x1 refill. Patient clear for surgery per Dr. Juarez.

## 2024-08-02 ENCOUNTER — APPOINTMENT (OUTPATIENT)
Dept: GENERAL RADIOLOGY | Facility: HOSPITAL | Age: 69
End: 2024-08-02
Attending: STUDENT IN AN ORGANIZED HEALTH CARE EDUCATION/TRAINING PROGRAM
Payer: MEDICARE

## 2024-08-02 ENCOUNTER — HOSPITAL ENCOUNTER (OUTPATIENT)
Facility: HOSPITAL | Age: 69
Discharge: HOME HEALTH | End: 2024-08-03
Attending: STUDENT IN AN ORGANIZED HEALTH CARE EDUCATION/TRAINING PROGRAM | Admitting: STUDENT IN AN ORGANIZED HEALTH CARE EDUCATION/TRAINING PROGRAM
Payer: MEDICARE

## 2024-08-02 ENCOUNTER — ANESTHESIA (OUTPATIENT)
Dept: SURGERY | Facility: HOSPITAL | Age: 69
End: 2024-08-02
Payer: MEDICARE

## 2024-08-02 ENCOUNTER — ANESTHESIA EVENT (OUTPATIENT)
Dept: SURGERY | Facility: HOSPITAL | Age: 69
End: 2024-08-02
Payer: MEDICARE

## 2024-08-02 DIAGNOSIS — M16.11 PRIMARY OSTEOARTHRITIS OF RIGHT HIP: ICD-10-CM

## 2024-08-02 PROBLEM — Z96.641 STATUS POST RIGHT HIP REPLACEMENT: Status: ACTIVE | Noted: 2024-08-02

## 2024-08-02 PROCEDURE — 88311 DECALCIFY TISSUE: CPT | Performed by: STUDENT IN AN ORGANIZED HEALTH CARE EDUCATION/TRAINING PROGRAM

## 2024-08-02 PROCEDURE — 97535 SELF CARE MNGMENT TRAINING: CPT

## 2024-08-02 PROCEDURE — 97161 PT EVAL LOW COMPLEX 20 MIN: CPT

## 2024-08-02 PROCEDURE — 72170 X-RAY EXAM OF PELVIS: CPT | Performed by: STUDENT IN AN ORGANIZED HEALTH CARE EDUCATION/TRAINING PROGRAM

## 2024-08-02 PROCEDURE — 0SR90JZ REPLACEMENT OF RIGHT HIP JOINT WITH SYNTHETIC SUBSTITUTE, OPEN APPROACH: ICD-10-PCS | Performed by: STUDENT IN AN ORGANIZED HEALTH CARE EDUCATION/TRAINING PROGRAM

## 2024-08-02 PROCEDURE — 76000 FLUOROSCOPY <1 HR PHYS/QHP: CPT | Performed by: STUDENT IN AN ORGANIZED HEALTH CARE EDUCATION/TRAINING PROGRAM

## 2024-08-02 PROCEDURE — 97110 THERAPEUTIC EXERCISES: CPT

## 2024-08-02 PROCEDURE — 88305 TISSUE EXAM BY PATHOLOGIST: CPT | Performed by: STUDENT IN AN ORGANIZED HEALTH CARE EDUCATION/TRAINING PROGRAM

## 2024-08-02 DEVICE — ARTICUL/EZE FEMORAL HEAD CERAMIC 12/14 TAPER 36MM MINUS  2
Type: IMPLANTABLE DEVICE | Site: HIP | Status: FUNCTIONAL
Brand: ARTICUL/EZE

## 2024-08-02 DEVICE — EMPHASYS POLYETHYLENE LINER AOX +4 NEUTRAL 52MM 36MM
Type: IMPLANTABLE DEVICE | Site: HIP | Status: FUNCTIONAL
Brand: EMPHASYS

## 2024-08-02 DEVICE — EMPHASYS ACETABULAR SHELL THREE-HOLE 52MM CEMENTLESS
Type: IMPLANTABLE DEVICE | Site: HIP | Status: FUNCTIONAL
Brand: EMPHASYS

## 2024-08-02 DEVICE — PINNACLE CANCELLOUS BONE SCREW 6.5MM X 25MM
Type: IMPLANTABLE DEVICE | Site: HIP | Status: FUNCTIONAL
Brand: PINNACLE

## 2024-08-02 DEVICE — ACTIS DUOFIX HIP PROSTHESIS (FEMORAL STEM 12/14 TAPER CEMENTLESS SIZE 3 STD COLLAR)  CE
Type: IMPLANTABLE DEVICE | Site: HIP | Status: FUNCTIONAL
Brand: ACTIS

## 2024-08-02 RX ORDER — SODIUM CHLORIDE 9 MG/ML
INJECTION, SOLUTION INTRAVENOUS CONTINUOUS
Status: DISCONTINUED | OUTPATIENT
Start: 2024-08-02 | End: 2024-08-03

## 2024-08-02 RX ORDER — FAMOTIDINE 10 MG/ML
20 INJECTION, SOLUTION INTRAVENOUS ONCE
Status: COMPLETED | OUTPATIENT
Start: 2024-08-02 | End: 2024-08-02

## 2024-08-02 RX ORDER — HYDROMORPHONE HYDROCHLORIDE 1 MG/ML
0.6 INJECTION, SOLUTION INTRAMUSCULAR; INTRAVENOUS; SUBCUTANEOUS EVERY 5 MIN PRN
Status: DISCONTINUED | OUTPATIENT
Start: 2024-08-02 | End: 2024-08-02 | Stop reason: HOSPADM

## 2024-08-02 RX ORDER — HYDROMORPHONE HYDROCHLORIDE 1 MG/ML
0.4 INJECTION, SOLUTION INTRAMUSCULAR; INTRAVENOUS; SUBCUTANEOUS EVERY 5 MIN PRN
Status: DISCONTINUED | OUTPATIENT
Start: 2024-08-02 | End: 2024-08-02 | Stop reason: HOSPADM

## 2024-08-02 RX ORDER — DOCUSATE SODIUM 100 MG/1
100 CAPSULE, LIQUID FILLED ORAL 2 TIMES DAILY
Status: DISCONTINUED | OUTPATIENT
Start: 2024-08-02 | End: 2024-08-03

## 2024-08-02 RX ORDER — DIPHENHYDRAMINE HYDROCHLORIDE 50 MG/ML
25 INJECTION INTRAMUSCULAR; INTRAVENOUS ONCE AS NEEDED
Status: ACTIVE | OUTPATIENT
Start: 2024-08-02 | End: 2024-08-02

## 2024-08-02 RX ORDER — BISACODYL 10 MG
10 SUPPOSITORY, RECTAL RECTAL
Status: DISCONTINUED | OUTPATIENT
Start: 2024-08-02 | End: 2024-08-03

## 2024-08-02 RX ORDER — SODIUM CHLORIDE, SODIUM LACTATE, POTASSIUM CHLORIDE, CALCIUM CHLORIDE 600; 310; 30; 20 MG/100ML; MG/100ML; MG/100ML; MG/100ML
INJECTION, SOLUTION INTRAVENOUS CONTINUOUS
Status: DISCONTINUED | OUTPATIENT
Start: 2024-08-02 | End: 2024-08-02 | Stop reason: HOSPADM

## 2024-08-02 RX ORDER — LEVOTHYROXINE SODIUM 0.15 MG/1
150 TABLET ORAL
Status: DISCONTINUED | OUTPATIENT
Start: 2024-08-03 | End: 2024-08-03

## 2024-08-02 RX ORDER — ONDANSETRON 2 MG/ML
4 INJECTION INTRAMUSCULAR; INTRAVENOUS EVERY 6 HOURS PRN
Status: DISCONTINUED | OUTPATIENT
Start: 2024-08-02 | End: 2024-08-03

## 2024-08-02 RX ORDER — SENNOSIDES 8.6 MG
17.2 TABLET ORAL NIGHTLY
Status: DISCONTINUED | OUTPATIENT
Start: 2024-08-02 | End: 2024-08-03

## 2024-08-02 RX ORDER — METOCLOPRAMIDE HYDROCHLORIDE 5 MG/ML
10 INJECTION INTRAMUSCULAR; INTRAVENOUS EVERY 8 HOURS PRN
Status: DISCONTINUED | OUTPATIENT
Start: 2024-08-02 | End: 2024-08-03

## 2024-08-02 RX ORDER — DIPHENHYDRAMINE HCL 25 MG
25 CAPSULE ORAL EVERY 4 HOURS PRN
Status: DISCONTINUED | OUTPATIENT
Start: 2024-08-02 | End: 2024-08-03

## 2024-08-02 RX ORDER — DIPHENHYDRAMINE HYDROCHLORIDE 50 MG/ML
12.5 INJECTION INTRAMUSCULAR; INTRAVENOUS EVERY 4 HOURS PRN
Status: DISCONTINUED | OUTPATIENT
Start: 2024-08-02 | End: 2024-08-03

## 2024-08-02 RX ORDER — BUPIVACAINE HYDROCHLORIDE 7.5 MG/ML
INJECTION, SOLUTION INTRASPINAL AS NEEDED
Status: DISCONTINUED | OUTPATIENT
Start: 2024-08-02 | End: 2024-08-02 | Stop reason: SURG

## 2024-08-02 RX ORDER — OXYCODONE HYDROCHLORIDE 5 MG/1
5 TABLET ORAL EVERY 4 HOURS PRN
Status: DISCONTINUED | OUTPATIENT
Start: 2024-08-02 | End: 2024-08-03

## 2024-08-02 RX ORDER — MORPHINE SULFATE 10 MG/ML
6 INJECTION, SOLUTION INTRAMUSCULAR; INTRAVENOUS EVERY 10 MIN PRN
Status: DISCONTINUED | OUTPATIENT
Start: 2024-08-02 | End: 2024-08-02 | Stop reason: HOSPADM

## 2024-08-02 RX ORDER — ONDANSETRON 2 MG/ML
INJECTION INTRAMUSCULAR; INTRAVENOUS AS NEEDED
Status: DISCONTINUED | OUTPATIENT
Start: 2024-08-02 | End: 2024-08-02 | Stop reason: SURG

## 2024-08-02 RX ORDER — POLYETHYLENE GLYCOL 3350 17 G/17G
17 POWDER, FOR SOLUTION ORAL DAILY PRN
Status: DISCONTINUED | OUTPATIENT
Start: 2024-08-02 | End: 2024-08-03

## 2024-08-02 RX ORDER — ENEMA 19; 7 G/133ML; G/133ML
1 ENEMA RECTAL ONCE AS NEEDED
Status: DISCONTINUED | OUTPATIENT
Start: 2024-08-02 | End: 2024-08-03

## 2024-08-02 RX ORDER — FAMOTIDINE 20 MG/1
20 TABLET, FILM COATED ORAL ONCE
Status: COMPLETED | OUTPATIENT
Start: 2024-08-02 | End: 2024-08-02

## 2024-08-02 RX ORDER — LIDOCAINE HYDROCHLORIDE 10 MG/ML
INJECTION, SOLUTION EPIDURAL; INFILTRATION; INTRACAUDAL; PERINEURAL AS NEEDED
Status: DISCONTINUED | OUTPATIENT
Start: 2024-08-02 | End: 2024-08-02 | Stop reason: SURG

## 2024-08-02 RX ORDER — KETOROLAC TROMETHAMINE 30 MG/ML
30 INJECTION, SOLUTION INTRAMUSCULAR; INTRAVENOUS EVERY 6 HOURS
Status: DISCONTINUED | OUTPATIENT
Start: 2024-08-02 | End: 2024-08-03

## 2024-08-02 RX ORDER — ASPIRIN 81 MG/1
81 TABLET ORAL 2 TIMES DAILY
Status: DISCONTINUED | OUTPATIENT
Start: 2024-08-02 | End: 2024-08-03

## 2024-08-02 RX ORDER — SODIUM CHLORIDE, SODIUM LACTATE, POTASSIUM CHLORIDE, CALCIUM CHLORIDE 600; 310; 30; 20 MG/100ML; MG/100ML; MG/100ML; MG/100ML
INJECTION, SOLUTION INTRAVENOUS CONTINUOUS
Status: DISCONTINUED | OUTPATIENT
Start: 2024-08-02 | End: 2024-08-03

## 2024-08-02 RX ORDER — HYDROMORPHONE HYDROCHLORIDE 1 MG/ML
0.2 INJECTION, SOLUTION INTRAMUSCULAR; INTRAVENOUS; SUBCUTANEOUS EVERY 5 MIN PRN
Status: DISCONTINUED | OUTPATIENT
Start: 2024-08-02 | End: 2024-08-02 | Stop reason: HOSPADM

## 2024-08-02 RX ORDER — METOCLOPRAMIDE 10 MG/1
10 TABLET ORAL ONCE
Status: COMPLETED | OUTPATIENT
Start: 2024-08-02 | End: 2024-08-02

## 2024-08-02 RX ORDER — DEXAMETHASONE SODIUM PHOSPHATE 4 MG/ML
VIAL (ML) INJECTION AS NEEDED
Status: DISCONTINUED | OUTPATIENT
Start: 2024-08-02 | End: 2024-08-02 | Stop reason: SURG

## 2024-08-02 RX ORDER — CELECOXIB 200 MG/1
400 CAPSULE ORAL ONCE
Status: COMPLETED | OUTPATIENT
Start: 2024-08-02 | End: 2024-08-02

## 2024-08-02 RX ORDER — BUPROPION HYDROCHLORIDE 300 MG/1
300 TABLET ORAL DAILY
Status: DISCONTINUED | OUTPATIENT
Start: 2024-08-02 | End: 2024-08-03

## 2024-08-02 RX ORDER — MORPHINE SULFATE 4 MG/ML
4 INJECTION, SOLUTION INTRAMUSCULAR; INTRAVENOUS EVERY 10 MIN PRN
Status: DISCONTINUED | OUTPATIENT
Start: 2024-08-02 | End: 2024-08-02 | Stop reason: HOSPADM

## 2024-08-02 RX ORDER — ACETAMINOPHEN 500 MG
1000 TABLET ORAL ONCE
Status: COMPLETED | OUTPATIENT
Start: 2024-08-02 | End: 2024-08-02

## 2024-08-02 RX ORDER — METOCLOPRAMIDE HYDROCHLORIDE 5 MG/ML
10 INJECTION INTRAMUSCULAR; INTRAVENOUS ONCE
Status: COMPLETED | OUTPATIENT
Start: 2024-08-02 | End: 2024-08-02

## 2024-08-02 RX ORDER — PREGABALIN 75 MG/1
75 CAPSULE ORAL 2 TIMES DAILY
Status: DISCONTINUED | OUTPATIENT
Start: 2024-08-02 | End: 2024-08-03

## 2024-08-02 RX ORDER — TRAMADOL HYDROCHLORIDE 50 MG/1
50 TABLET ORAL EVERY 6 HOURS SCHEDULED
Status: DISCONTINUED | OUTPATIENT
Start: 2024-08-02 | End: 2024-08-03

## 2024-08-02 RX ORDER — NALOXONE HYDROCHLORIDE 0.4 MG/ML
0.08 INJECTION, SOLUTION INTRAMUSCULAR; INTRAVENOUS; SUBCUTANEOUS AS NEEDED
Status: DISCONTINUED | OUTPATIENT
Start: 2024-08-02 | End: 2024-08-02 | Stop reason: HOSPADM

## 2024-08-02 RX ORDER — ASPIRIN 81 MG/1
81 TABLET ORAL 2 TIMES DAILY
Qty: 60 TABLET | Refills: 0 | Status: SHIPPED | COMMUNITY
Start: 2024-08-02

## 2024-08-02 RX ORDER — ACETAMINOPHEN 500 MG
1000 TABLET ORAL EVERY 6 HOURS
Status: DISCONTINUED | OUTPATIENT
Start: 2024-08-02 | End: 2024-08-03

## 2024-08-02 RX ORDER — ACETAMINOPHEN 500 MG
1000 TABLET ORAL ONCE
Status: DISCONTINUED | OUTPATIENT
Start: 2024-08-02 | End: 2024-08-02

## 2024-08-02 RX ORDER — FAMOTIDINE 10 MG/ML
20 INJECTION, SOLUTION INTRAVENOUS 2 TIMES DAILY
Status: DISCONTINUED | OUTPATIENT
Start: 2024-08-02 | End: 2024-08-03

## 2024-08-02 RX ORDER — FAMOTIDINE 20 MG/1
20 TABLET, FILM COATED ORAL 2 TIMES DAILY
Status: DISCONTINUED | OUTPATIENT
Start: 2024-08-02 | End: 2024-08-03

## 2024-08-02 RX ORDER — TRANEXAMIC ACID 10 MG/ML
INJECTION, SOLUTION INTRAVENOUS AS NEEDED
Status: DISCONTINUED | OUTPATIENT
Start: 2024-08-02 | End: 2024-08-02 | Stop reason: SURG

## 2024-08-02 RX ORDER — MORPHINE SULFATE 4 MG/ML
2 INJECTION, SOLUTION INTRAMUSCULAR; INTRAVENOUS EVERY 10 MIN PRN
Status: DISCONTINUED | OUTPATIENT
Start: 2024-08-02 | End: 2024-08-02 | Stop reason: HOSPADM

## 2024-08-02 RX ADMIN — LIDOCAINE HYDROCHLORIDE 50 MG: 10 INJECTION, SOLUTION EPIDURAL; INFILTRATION; INTRACAUDAL; PERINEURAL at 08:40:00

## 2024-08-02 RX ADMIN — TRANEXAMIC ACID 1000 MG: 10 INJECTION, SOLUTION INTRAVENOUS at 10:05:00

## 2024-08-02 RX ADMIN — DEXAMETHASONE SODIUM PHOSPHATE 4 MG: 4 MG/ML VIAL (ML) INJECTION at 08:32:00

## 2024-08-02 RX ADMIN — ONDANSETRON 4 MG: 2 INJECTION INTRAMUSCULAR; INTRAVENOUS at 08:32:00

## 2024-08-02 RX ADMIN — TRANEXAMIC ACID 1000 MG: 10 INJECTION, SOLUTION INTRAVENOUS at 08:54:00

## 2024-08-02 RX ADMIN — BUPIVACAINE HYDROCHLORIDE 1.4 ML: 7.5 INJECTION, SOLUTION INTRASPINAL at 08:41:00

## 2024-08-02 NOTE — OPERATIVE REPORT
[facility] [place]  OPERATIVE REPORT    DATE OF SURGERY: 8/2/2024    PREOPERATIVE DIAGNOSIS:   Right Hip Osteoarthritis  Morbid Obesity BMI 40.10 kg/m2    POST-OPERATIVE DIAGNOSIS:   Right Hip Osteoarthritis  Morbid Obesity BMI 40.10 kg/m2    PROCEDURE:  Right Total Hip Arthroplasty  Intra-operative Interpretation of Fluoroscopy    Location: [facility]    SURGEON: Omar A Behery, MD  Assistant(s): Cain Hanson MD    Anesthesia type:  Spinal  Estimated Blood Loss: 300cc    Drains: None    Findings: Consistent with advanced degenerative joint disease    Specimen: Femoral head    Complications: None immediately apparent    Implants:  Acetabular Component: Depuy Emphasys, size 52mm 3 hole shell, 36mm +4 Polyethylene Liner AOX, One 6.5mm dome screw  Femoral Component: Press-fit size 3, standard offset, Depuy Actis femoral component  Head: 36mm -2 delta ceramic head.     Indication:     This is a 68 year old lady, who presented with chronic hip pain refractory to non-operative treatment, and impairing activities of daily living. Radiographic evaluation demonstrated hip osteoarthritis . Surgical versus non-surgical options were discussed with the patient, and together we decided it is best to proceed with a total hip arthroplasty with the goals of pain relief and improvement in function. All risks and benefits of surgery including bleeding, infection, dislocation, temitope-prosthetic fracture, neurovascular injury, leg length or offset discrepancy, as well as medical and anesthesia-related complications were discussed with the patient / family, who elected to proceed with surgery.     Procedure in detail:    The patient was brought to the operating room, and underwent the above anesthesia.     The patient was placed in a supine position with both feet secured in boots on the [place] table.  The operative hip was sterilely prepped and draped in a usual orthopedic fashion.      A surgical pause was conducted to  reconfirm the correct patient, site, side, and procedure to be performed.  Perioperative antibiotics were given within one hour prior to the procedure.      An approximately 8cm long incision was made beginning proximally 2 cm lateral and 2 cm distal to the anterior-superior iliac spine and extending distally toward the ipsilateral lateral epicondyle of the knee.  Electrocautery was used to dissect through the subcutaneous tissue. The fascia overlying the TFL was incised in line with its fibers.  A Salazar elevator was used to separate the fascia from the TFL.  A finger then was placed along the superior femoral neck, and a cobra retractor was placed.  A cerebellar retractor was placed distally between the rectus and the tensor muscle.     Using electrocautery and a Schnidt tonsil clamp, the ascending branches of the lateral circumflex artery were ligated.  The investing fascia over the capsule was divided, and another cobra retractor was placed inferior to the femoral neck.  The pericapsular fat was then excised from around the anterior hip capsule.     The anterior hip capsule was then incised with electrocautery starting at the edge of the acetabulum in line with the anterosuperior edge of the femoral neck and extending distally to the anterior intertrochanteric line, dividing the capsule at about a 135-degree angle. The medial and lateral capsule flaps were elevated off the of the proximal femur intertrochanteric line, and No. 5 Ethibond tagging sutures were placed in the both flaps of the capsule. A Hohmann retractor was placed posterosuperiorly over the acetabulum and the capsule was elevated a few millimeters off of the acetabular rim and ilium. The hip was externally rotated to 90 degrees, allowing an inferomedial split of the capsule. The hip was rotated back and a double pronged retractor was placed inferomedially instead of the cobra.     The superior cobra was placed inside the capsule. A marci was made on  the femoral neck in line with the planned femoral neck resection.  A reciprocating saw was used to complete the femoral neck osteotomy.  The hip was externally rotated to 70 degrees, and traction was applied. A spiral femoral hip screw was placed in the femoral head, and the femoral head was removed from the wound.      A No. 1 retractor over the anterior wall of the acetabulum and the No. 2 retractor was placed postero-superiorly.  This exposed the acetabulum. We then used electrocautery to excise the labrum and pulvinar. Fluoroscopy was utilized to obtain a perfect AP pelvis radiograph.  We then started reaming and sequentially expanded the socket. The last reamer was a 51-mm reamer.  Once appropriate reaming and positioning of the cup was determined with fluoroscopy, we then placed a final 52 mm acetabular shell in the appropriate degree of anteversion and abduction.  There was an excellent press fit.  Supplemental 6.5mm cancellous bone screws were drilled, measured and placed.  The final  36 mm -4 polyethylene acetabular liner was impacted into place without difficulty. The liner was checked and noted to be well seated.     Traction was released, and then we turned our attention to the femur. The femoral hook from the [place] bed was placed around the vastus ridge.  The leg was externally rotated 120 degrees. An asymmetric double pronged retractor was placed distally medially over the calcar, and the leg was then extended and adducted. A #1 retractor was placed posteriorly between the superolateral capsule and the gluteus minimus. The lateral capsule was then released off the femur, allowing for improved elevation of the femur. The conjoined was released but the obturator externus and piriformis tendons were not released. The hook was elevated, a double pronged retractor was placed over the greater trochanter and appropriate exposure of the femur was achieved.     A box osteotome was used to identify the starting  location. A rongeur was used to remove posterolateral cortical bone to allow neutral broaching.  The femoral canal was then broached sequentially to a size 3 stem.  The broach was left in place, and a std offset neck with a 36-mm -2 trial head were placed.  The leg was abducted and raised, and the hip was then reduced. Fluoroscopic imaging demonstrated appropriate leg length and offset.  The hip was found to have appropriate soft tissue tension.  Hip range of motion was tested and noted to be stable throughout.    We then elected to implant these components.  The hip was re-dislocated.  The trial components were removed. The femur was exposed again, and the final size 3, std offset stem was impacted into place. The calcar was checked and noted to be intact without any evidence of fracture. A 36 mm -2 femoral head was retrialed with appropriate soft tissue tension.  The final 36 mm -2  femoral head was then impacted onto a cleaned/dried trunnion.  The hip was then reduced.     The wound was thoroughly irrigated with dilute betadine solution followed by normal saline. Pain cocktail injection was delivered to the soft tissues. The capsule was closed with No. 1 Vicryl interrupted sutures. The fascia over the TFL was closed with a running Quill suture.  The subcutaneous tissues were then closed with 2-0 monocryl.  The skin was closed with a running 3-0 monocryl suture.  Dermabond was applied to the skin edges and a dry sterile dressing was placed.  The patient was awakened from anesthesia and taken to the PACU in stable condition.  There were no immediate complications.     There was no qualified resident available to assist because qualified residents were assisting with other surgery. The fellow assistant was necessary for help with positioning, draping, retraction, and wound closure.     We billed for modifier 22 in this case given morbid obesity and BMI of 40.10 kg /m2, and a large soft tissue envelope which  significantly increased the amount of time needed for exposure, careful dissection, bony preparation and component implantation.      POST-OPERATIVE PLAN  The patient will be permitted weight bearing as tolerated on the operative extremity  The patient will be permitted range of motion as tolerated  The patient will receive 24 hours of perioperative antibiotics.  Venous thromboembolic prophylaxis will consist of early mobilization, mechanical compressive devices, and ASA.    The dressing may be removed at 12 days post-operatively  The patient should be scheduled for follow up in 2 weeks for wound and radiographic evaluation.

## 2024-08-02 NOTE — CM/SW NOTE
SW received MDO for surgery/post surgical.    Pt is outpt in a bed status on 1E.      Pt has Outpatient in home PT (OPITH) arranged pre operatively by Dr. Behery's' office.       / to remain available for support and/or discharge planning.      Tamie LABOY MSW, LSW  Social Work/Case Management

## 2024-08-02 NOTE — ANESTHESIA POSTPROCEDURE EVALUATION
Patient: Teodora Burns    Procedure Summary       Date: 08/02/24 Room / Location: [facility] MAIN OR  / [facility] MAIN OR    Anesthesia Start: 0832 Anesthesia Stop:     Procedure: Right Anterior Total Hip Arthroplasty (Right: Hip) Diagnosis:       Primary osteoarthritis of right hip      (Right Hip Osteoarthritis)    Surgeons: Behery, Omar Atef, MD Anesthesiologist: Clifford Morton MD    Anesthesia Type: spinal ASA Status: 2            Anesthesia Type: spinal    Vitals Value Taken Time   /54 08/02/24 1111   Temp 98 °F (36.7 °C) 08/02/24 1051   Pulse 57 08/02/24 1119   Resp 12 08/02/24 1119   SpO2 97 % 08/02/24 1119   Vitals shown include unfiled device data.    [facility] AN Post Evaluation:   Patient Evaluated in PACU  Patient Participation: complete - patient participated  Level of Consciousness: sleepy but conscious  Pain Score: 0  Pain Management: adequate  Airway Patency:patent  Dental exam unchanged from preop  Yes    Cardiovascular Status: acceptable  Respiratory Status: acceptable  Postoperative Hydration acceptable      Cain Wolf CRNA  8/2/2024 11:20 AM

## 2024-08-02 NOTE — DISCHARGE INSTRUCTIONS
DISCHARGE INSTRUCTIONS:  PLACE ICE TO SURGICAL AREA 20-30 MINUTES ON/ 20-30 MINUTES OFF. THIS IS TO HELP WITH PAIN & SWELLING.    TAKE YOUR MEDICATIONS AS PRESCRIBED BY YOUR DOCTOR BELOW.THESE HAVE BEEN SENT TO YOUR PHARMACY.    IF YOU WERE INSTRUCTED BY PHYSICAL THERAPY TO EXERCISE HIP PRECAUTIONS, CONTINUE TO DO SO AFTER DISCHARGE    IT IS OK TO BEAR WEIGHT AS TOLERATED ON THE OPERATIVE EXTREMITY.     CALL TO CONFIRM YOUR FOLLOW UP APPOINTMENT WITH DR. BEHERY TO BE SEEN IN 2-3 WEEKS POSTOP. [phone]    MEDICATIONS    POST OP MEDICATION REGIMEN              MULTI-MODAL   PAIN REGIMEN     DRUG   FOR   FREQUENCY & DURATION   QTY   NOTES     WEANING  TIPS       Gabapentin 100mg   Nerve pain   Take 2 tabs every 8 hours for 14 days    90   No refills You may discontinue this medication prior to 14 days if you do not tolerate it.        Tylenol 500mg     Mild pain   Take 2 tabs every 6 hours     90   Can purchase over-the-counter    Stop using medication if no longer having pain.      Tramadol 50mg     Moderate pain   Take 1-2 tabs every 6 hours only as needed   45 May prescribe a refill, but ideally, this should be tapered off after surgery Stop using this medication 2nd   As pain decreases over time, increase the interval between doses (6 hours to 8, then 12, then 24) to taper off the medication.      Meloxicam 15mg     Inflammation   Take 1 tab daily for 30 days     30   May refill if needed beyond 30 days   Recommend completing the 30 day supply.           BREAKTHROUGH PAIN         Oxycodone 5mg       Severe pain     Take 1-2 tabs every 4-6 hours only as needed for severe pain       40   Take at least 1 hr apart from Tramadol.    Ideally, no refill. The goal is to taper off this medication as soon as possible, as it can be addictive and have side effects.    Stop using this medication 1st if no longer having severe pain.         BLOOD THINNER     Aspirin 81mg   Blood clot prevention   Take 1 tab twice daily for  30 days     60     No refills   Complete the entire course of your blood thinner.         ANTIBIOTIC       Cefadroxil 500mg       Infection prevention     Take 1 tab twice daily for 7 days     14     No refills   Complete the entire course of your prophylactic antibiotic.           STOOL SOFTENER     Sennokot 8.6/50mg   Constipation   Take 1 tab twice daily  while on opioids     60 Refer to discharge instructions if constipation persists even after taking this medication   Stop taking if having diarrhea or loose stools.           ANTI-NAUSEA   Ondansetron 4mg   Nausea   Take 1 tab every 8 hours as needed if nauseous     20   No Refill      ANTI-ACID REFLUX / GASTRITIS   Pantoprazole 40mg   Acid reflux, gastric ulcer prophylaxis   Take 1 tab every day along with Meloxicam     60   May refill if Meloxicam refilled        DRESSING:    YOU HAVE A SPECIAL DRESSING CALLED AN AQUACEL DRESSING OVER YOUR INCISION.    THE DRESSING STAYS FOR 12 DAYS FROM SURGERY.    YOU MAY SHOWER AS SOON AS YOU RETURN HOME WITH THE AQUACEL DRESSING INTACT OVER YOUR INCISION AREA.    IF THE DRESSING LEAKS OR COMES LOOSE BEFORE THE 7 DAY PERIOD CONTACT YOUR DOCTOR / SURGEON.    AFTER   12 DAYS THE DRESSING IS REMOVED BY PULLING ON THE EDGE OF THE DRESSING AND GENTLY STRETCHING IT, THEN PEEL IT OFF SLOWLY LIKE A BANDAID. IF YOU HAVE ANY QUESTIONS OR CONCERNS ABOUT THE DRESSING CONTACT YOUR DOCTOR.    IF DRAINAGE IS PRESENT AT ANYTIME FROM YOUR DRESSING OR FROM YOUR INCISION CALL YOUR DOCTOR / SURGEON AS SOON AS POSSIBLE.      REASONS TO CALL YOUR DOCTOR:  TEMPERATURE .0 OR MORE  PERSISTANT NAUSEA OR VOMITTING - ESPECIALLY IF YOU ARE UNABLE TO EAT OR DRINK.  INCREASED LEVEL OF PAIN OR PAIN WHICH IS NOT CONTROLLED BY YOUR PAIN MEDICINE.  PERSISTENT DRAINAGE AT ANYTIME FROM YOUR SURGICAL AREA / INCISION.  PAIN, TENDERNESS OR SUDDEN SWELLING IN YOUR CALF REGION OF THE LOWER EXTREMITIES - THIS IS A POSSIBLE SIGN OF A BLOOD CLOT.  ANY CHANGES IN  SENSATION IN YOUR BODY IN THE AREA OF YOUR SURGERY = NUMBNESS OR TINGLING.  ANY CHANGES IN CIRCULATION IN YOUR BODY IN THE AREA OF YOUR SURGERY = CHANGES  IN COLOR EITHER DARKER OR VERY PALE; OR  IF AREA FEELS COLD TO THE TOUCH AS COMPARED TO OTHER AREAS.  ANY CHANGES IN FUNCTION IN YOUR BODY IN THE AREA OF YOUR SURGERY = CHANGE IN MOVEMENT - UNABLE TO MOVE OR WIGGLE FINGERS, TOES OR FOOT OR ANY OTHER CHANGES IN NORMAL BODY FUNCTIONING.  FOR ANY OF THE ABOVE OR ANY OTHER QUESTIONS OR CONCERNS CALL YOUR DOCTOR/ SURGEON AS SOON AS POSSIBLE.      Omar Behery, MD MPH  Orthopaedic Surgeon, Adult Hip and Knee Reconstruction  [facility] at [address], [address] [address]  Office: (P) [phone] (F) [phone]  Adminstrative Assistant: Tamie Otto       Dear Patient,     [facility] [employer] cares about your progress with recovery following your joint replacement surgery.     300 days from your scheduled surgery, [facility] will send you a follow-up survey to help us understand how your surgery impacted your mobility, pain, and overall quality of life. Please make every effort to complete this survey. The information collected from this survey will be used by your physician to track your recovery.     Sincerely,     [facility]

## 2024-08-02 NOTE — PHYSICAL THERAPY NOTE
PHYSICAL THERAPY HIP EVALUATION - INPATIENT     Room Number: Room 2/Room 2-A  Evaluation Date: 8/2/2024  Type of Evaluation: Initial  Physician Order: PT Eval and Treat    Presenting Problem: s/p R ant JOHN 8/2/24; R hip OA     Reason for Therapy: Mobility Dysfunction and Discharge Planning    PHYSICAL THERAPY ASSESSMENT   Patient is a 68 year old female admitted 8/2/2024 s/p R hip JOHN (anterior) 2/2 R hip OA.  Prior to admission, patient's baseline is independent.  Patient is currently functioning below baseline with bed mobility, transfers, gait, and stair negotiation.  Patient is requiring stand-by assist as a result of the following impairments: decreased functional strength, pain, and post-op restrictions .  Physical Therapy will continue to follow for duration of hospitalization.    Patient will benefit from continued skilled PT Services at discharge to promote prior level of function.  Anticipate patient will return home with home health PT.    PLAN  PT Treatment Plan: Bed mobility;Body mechanics;Patient education;Gait training;Range of motion;Strengthening;Stoop training;Stair training;Transfer training  Rehab Potential : Good  Frequency (Obs): Daily    PHYSICAL THERAPY MEDICAL/SOCIAL HISTORY   Problem List  Active Problems:    * No active hospital problems. *      HOME SITUATION  Home Situation  Type of Home: House  Home Layout: One level (with basement)  Stairs to Enter : 4  Railing: Yes  Stairs to Bedroom: 0  Lives With: Spouse  Drives: Yes  Patient Owned Equipment: Rolling walker;Cane  Patient Regularly Uses: None     Prior Level of [place]: independent- intermittent use of cane. Drives. Retired but does work for [employer].     SUBJECTIVE  \"I am wiggly\"    PHYSICAL THERAPY EXAMINATION   OBJECTIVE  Precautions: Limb alert - right;JOHN - anterior  Fall Risk: Standard fall risk    WEIGHT BEARING RESTRICTION  Weight Bearing Restriction: R lower extremity  R Lower Extremity: Weight Bearing as  Tolerated      PAIN ASSESSMENT  Ratin  Location: R hip  Management Techniques: Activity promotion;Body mechanics;Repositioning    COGNITION  Overall Cognitive Status:  WFL - within functional limits    RANGE OF MOTION AND STRENGTH ASSESSMENT  Upper extremity ROM and strength are within functional limits   Lower extremity ROM is within functional limits   Lower extremity strength is within functional limits     BALANCE  Static Sitting: Good  Dynamic Sitting: Good  Static Standing: Fair  Dynamic Standing: Fair -      ACTIVITY TOLERANCE  No reports of increased pain during or at end of session. Denies lightheadedness.       AM-PAC '6-Clicks' INPATIENT SHORT FORM - BASIC MOBILITY  How much difficulty does the patient currently have...  Patient Difficulty: Turning over in bed (including adjusting bedclothes, sheets and blankets)?: A Little   Patient Difficulty: Sitting down on and standing up from a chair with arms (e.g., wheelchair, bedside commode, etc.): A Little   Patient Difficulty: Moving from lying on back to sitting on the side of the bed?: A Little   How much help from another person does the patient currently need...   Help from Another: Moving to and from a bed to a chair (including a wheelchair)?: A Little   Help from Another: Need to walk in hospital room?: A Little   Help from Another: Climbing 3-5 steps with a railing?: A Little     AM-PAC Score:  Raw Score: 18   Approx Degree of Impairment: 46.58%   Standardized Score (AM-PAC Scale): 43.63   CMS Modifier (G-Code): CK    FUNCTIONAL ABILITY STATUS  Functional Mobility/Gait Assessment  Gait Assistance:  (SBA)  Distance (ft): 100x2  Assistive Device: Rolling walker  Pattern:  (slow)  Stairs: Stairs  How Many Stairs: 4  Device: 2 Rails  Assist:  (SBA)  Pattern: Ascend and Descend  Ascend and Descend : Step to  Rolling: NT  Supine to Sit: NT  Sit to Supine: NT  Sit to Stand: SBA    Exercise/Education Provided:  Bed mobility  Body mechanics  Functional  activity tolerated  Gait training  Posture  Strengthening  Lower therapeutic exercise:  Ankle pumps  LAQ  Quad sets  Verbal review of hip ABD<>ADD in supine position  Transfer training    Skilled Therapy Provided: Pt greeted in chair. Instructed in TE while sitting in chair. Pt and  educated on proper mechanics for bed mobility. Sit<>stand performed with SBA to/from walker. Ambulates with RW and SBA. Negotiates 4 steps with step to gait pattern and B handrails- instructed in sequence prior to attempting. Returns to sitting in chair at end of session with all needs within reach. Session tolerated without adverse response. Verbalizes understanding of material taught in session. RN updated.     The patient's Approx Degree of Impairment: 46.58% has been calculated based on documentation in the [facility] '6 clicks' Inpatient Basic Mobility Short Form.  Research supports that patients with this level of impairment may benefit from HOME WITH HH. Findings today support this recommendation.   Final disposition will be made by interdisciplinary medical team.    Patient End of Session: Up in chair;Needs met;Call light within reach;RN aware of session/findings;All patient questions and concerns addressed;Ice applied;Family present    CURRENT GOALS  Goals to be met by: 8/7/24  Patient Goal Patient's self-stated goal is: to return to PLOF   Goal #1 Patient is able to demonstrate supine - sit EOB @ level: modified independent   Goal #1   Current Status    Goal #2 Patient is able to demonstrate transfers Sit to/from Stand at assistance level: modified independent     Goal #2  Current Status    Goal #3 Patient is able to ambulate 300 feet with assistive device at assistance level: modified independent    Goal #3   Current Status    Goal #4 Patient will negotiate 4 stairs/one curb w/ assistive device and supervision   Goal #4   Current Status    Goal #5 Patient verbalizes and/or demonstrates all precautions and safety concerns  independently   Goal #5   Current Status    Goal #6 Patient independently performs home exercise program for ROM/strengthening per the instructions provided in preparation for discharge.   Goal #6  Current Status      Patient Evaluation Complexity Level:  History High - 3 or more personal factors and/or co-morbidities   Examination of body systems Moderate - addressing a total of 3 or more elements   Clinical Presentation Low- Stable   Clinical Decision Making  Low Complexity     Self care/home management x1: 8 minutes   Therapeutic Exercise x1: 12 minutes

## 2024-08-02 NOTE — INTERVAL H&P NOTE
Pre-op Diagnosis: Right Hip Osteoarthritis    The above referenced H&P was reviewed by Omar Atef Behery, MD on 8/2/2024, the patient was examined and no significant changes have occurred in the patient's condition since the H&P was performed.  I discussed with the patient and/or legal representative the potential benefits, risks and side effects of this procedure; the likelihood of the patient achieving goals; and potential problems that might occur during recuperation.  I discussed reasonable alternatives to the procedure, including risks, benefits and side effects related to the alternatives and risks related to not receiving this procedure.  We will proceed with procedure as planned.

## 2024-08-02 NOTE — ANESTHESIA PROCEDURE NOTES
Spinal Block    Date/Time: 8/2/2024 8:32 AM    Performed by: Cain Wolf CRNA  Authorized by: Clifford Morton MD      General Information and Staff    Start Time:  8/2/2024 8:32 AM  End Time:  8/2/2024 8:41 AM  CRNA:  Cain Wolf CRNA  Performed by:  CRNA  Patient Location:  OR  Site identification: surface landmarks    Reason for Block: at surgeon's request and surgical anesthesia    Preanesthetic Checklist: patient identified, IV checked, risks and benefits discussed, monitors and equipment checked, pre-op evaluation, timeout performed, anesthesia consent and sterile technique used      Procedure Details    Patient Position:  Sitting  Prep: ChloraPrep and patient draped    Monitoring:  Continuous pulse ox and cardiac monitor  Approach:  Midline  Location:  L4-5  Injection Technique:  Single-shot    Needle    Needle Type:  Pencil-tip  Needle Gauge:  24 G    Assessment    Sensory Level:  T10  Events: clear CSF, CSF aspirated and well tolerated      Additional Comments

## 2024-08-02 NOTE — INTERVAL H&P NOTE
Pre-op Diagnosis: Right Hip Osteoarthritis    The above referenced H&P was reviewed by Cain Hanson MD on 8/2/2024, the patient was examined and no significant changes have occurred in the patient's condition since the H&P was performed.  I discussed with the patient and/or legal representative the potential benefits, risks and side effects of this procedure; the likelihood of the patient achieving goals; and potential problems that might occur during recuperation.  I discussed reasonable alternatives to the procedure, including risks, benefits and side effects related to the alternatives and risks related to not receiving this procedure.  We will proceed with procedure as planned.

## 2024-08-02 NOTE — PROGRESS NOTES
[facility]  part of Located within [facility]    Progress Note    Teodora Burns Patient Status:  Outpatient in a Bed    1955 MRN [id number]   Location [facility] Attending Behery, Omar Atef, MD   Hosp Day # 0 PCP YOLA QUINTANILLA PA       Subjective:   Teodora Burns is a(n) 68 year old female POD# 0 Feels she is doing well. No chest pain, dyspnea or nausea.     Objective:   Vital Signs:  Blood pressure 122/63, pulse 58, temperature 97 °F (36.1 °C), temperature source Oral, resp. rate 16, height 5' 7\" (1.702 m), weight 256 lb (116.1 kg), SpO2 96%.     General: No acute distress. Alert and oriented x 3.  HEENT: Moist mucous membranes. EOM-I. PERRL  Respiratory: Clear to auscultation bilaterally.  No wheezes. No rhonchi.  Cardiovascular: S1, S2.  Regular rate and rhythm.  No murmurs.  Abdomen: Soft, nontender, nondistended.   Neurologic: No focal neurological deficits.  Musculoskeletal: No calf tenderness.   Integument: No lesions. No erythema.  Psychiatric: Appropriate mood and affect.      Results:    Lab Results   Component Value Date    WBC 5.6 2024    HGB 15.0 2024    HCT 44.4 2024    .0 2024    CREATSERUM 0.83 2024    BUN 14 2024     2024    K 4.7 2024     2024    CO2 30.0 2024    GLU 98 2024    CA 10.2 2024    ALB 5.1 (H) 2024    ALKPHO 111 2024    BILT 0.6 2024    TP 7.9 2024    AST 26 2024    ALT 32 2024    PTT 26.5 2024    INR 0.90 2024         XR FLUOROSCOPY C-ARM TIME LESS THAN 1 HOUR (CPT=76000)    Result Date: 2024  CONCLUSION: Intraoperative fluoroscopy provided.  Please see surgical note for specific details.     Dictated by (CST): Candelario Shepherd MD on 2024 at 11:11 AM     Finalized by (CST): Candelario Shepherd MD on 2024 at 11:12 AM               Assessment and Plan:       Arthritis of right hip  Now replaced      Status post  right hip replacement  Stable post op       Essential hypertension        Class 2 obesity due to excess calories without serious comorbidity with body mass index (BMI) of 39.0 to 39.9 in adult        Hypothyroidism                Postoperative Recommendations:  Anticoagulation / DVT prophylaxis: SCDs, early ambulation. ASA 81 mg twice daily with food for four weeks.    Pain Control: per ortho  GI protection: Protonix  Incentive Spirometry   Telemetry as needed  CPAP/O2 as needed         Pain management and Physical therapy as per Orthopedic service.   Home Health as needed          Pawan Delarosa MD  8/2/2024

## 2024-08-02 NOTE — PLAN OF CARE
Problem: PAIN - ADULT  Goal: Verbalizes/displays adequate comfort level or patient's stated pain goal  Description: INTERVENTIONS:  - Encourage pt to monitor pain and request assistance  - Assess pain using appropriate pain scale  - Administer analgesics based on type and severity of pain and evaluate response  - Implement non-pharmacological measures as appropriate and evaluate response  - Consider cultural and social influences on pain and pain management  - Manage/alleviate anxiety  - Utilize distraction and/or relaxation techniques  - Monitor for opioid side effects  - Notify MD/LIP if interventions unsuccessful or patient reports new pain  - Anticipate increased pain with activity and pre-medicate as appropriate  8/2/2024 1722 by Isabelle Mac, RN  Outcome: Progressing     Problem: SKIN/TISSUE INTEGRITY - ADULT  Goal: Incision(s), wounds(s) or drain site(s) healing without S/S of infection  Description: INTERVENTIONS:  - Assess and document risk factors for pressure ulcer development  - Assess and document skin integrity  - Assess and document dressing/incision, wound bed, drain sites and surrounding tissue  8/2/2024 1722 by Isabelle Mac, RN  Outcome: Progressing     Received patient from recovery room alert and oriented x4. Aquacel dressing to R anterior hip intact. Ice applied. Pain managed with prescribed medications. Patient ambulates with stand by assist and a walker. Patient tolerates diet and voids freely/  Plan: home tomorrow.

## 2024-08-02 NOTE — BRIEF OP NOTE
Pre-Operative Diagnosis: Right Hip Osteoarthritis     Post-Operative Diagnosis: Right Hip Osteoarthritis      Procedure Performed:   Right Anterior Total Hip Arthroplasty    Surgeons and Role:     * Behery, Omar Atef, MD - Primary     * Cain Hanson MD - Assisting Surgeon    Assistant(s):        Surgical Findings: Right hip osteoarthritis     Specimen: Femoral head for routine     Estimated Blood Loss: Blood Output: 150 mL (8/2/2024 10:09 AM)      Please see full Op note for further details    Cain Hanson MD  Fellow, Adult Joint Replacement and Reconstruction  [facility]  08/02/24, 10:51

## 2024-08-03 VITALS
SYSTOLIC BLOOD PRESSURE: 104 MMHG | OXYGEN SATURATION: 97 % | RESPIRATION RATE: 18 BRPM | DIASTOLIC BLOOD PRESSURE: 55 MMHG | HEART RATE: 55 BPM | BODY MASS INDEX: 40.18 KG/M2 | HEIGHT: 67 IN | TEMPERATURE: 98 F | WEIGHT: 256 LBS

## 2024-08-03 LAB
ANION GAP SERPL CALC-SCNC: 5 MMOL/L (ref 0–18)
BUN BLD-MCNC: 14 MG/DL (ref 9–23)
BUN/CREAT SERPL: 18.2 (ref 10–20)
CALCIUM BLD-MCNC: 9.9 MG/DL (ref 8.7–10.4)
CHLORIDE SERPL-SCNC: 107 MMOL/L (ref 98–112)
CO2 SERPL-SCNC: 28 MMOL/L (ref 21–32)
CREAT BLD-MCNC: 0.77 MG/DL
DEPRECATED RDW RBC AUTO: 40.3 FL (ref 35.1–46.3)
EGFRCR SERPLBLD CKD-EPI 2021: 84 ML/MIN/1.73M2 (ref 60–?)
ERYTHROCYTE [DISTWIDTH] IN BLOOD BY AUTOMATED COUNT: 12.2 % (ref 11–15)
GLUCOSE BLD-MCNC: 103 MG/DL (ref 70–99)
HCT VFR BLD AUTO: 34 %
HGB BLD-MCNC: 11.8 G/DL
MCH RBC QN AUTO: 31.6 PG (ref 26–34)
MCHC RBC AUTO-ENTMCNC: 34.7 G/DL (ref 31–37)
MCV RBC AUTO: 90.9 FL
OSMOLALITY SERPL CALC.SUM OF ELEC: 291 MOSM/KG (ref 275–295)
PLATELET # BLD AUTO: 125 10(3)UL (ref 150–450)
PLATELETS.RETICULATED NFR BLD AUTO: 6.6 % (ref 0–7)
POTASSIUM SERPL-SCNC: 4.7 MMOL/L (ref 3.5–5.1)
RBC # BLD AUTO: 3.74 X10(6)UL
SODIUM SERPL-SCNC: 140 MMOL/L (ref 136–145)
WBC # BLD AUTO: 8.7 X10(3) UL (ref 4–11)

## 2024-08-03 PROCEDURE — 97116 GAIT TRAINING THERAPY: CPT

## 2024-08-03 PROCEDURE — 80048 BASIC METABOLIC PNL TOTAL CA: CPT | Performed by: FAMILY MEDICINE

## 2024-08-03 PROCEDURE — 97166 OT EVAL MOD COMPLEX 45 MIN: CPT

## 2024-08-03 PROCEDURE — 97535 SELF CARE MNGMENT TRAINING: CPT

## 2024-08-03 PROCEDURE — 97530 THERAPEUTIC ACTIVITIES: CPT

## 2024-08-03 PROCEDURE — 85027 COMPLETE CBC AUTOMATED: CPT | Performed by: FAMILY MEDICINE

## 2024-08-03 NOTE — PROGRESS NOTES
[facility]     ORTHO DAILY PROGRESS NOTE    Patient: Teodora Burns      Subjective:    Nothing acute overnight.  Pain in the operative extremity is well controlled.    Patient denies new onset numbness/tingling in the operative extremity.    Patient also denies chest pain / shortness of breath, nausea/vomiting.        Objective:      Vitals:    08/03/24 0730   BP: 97/55   Pulse: 53   Resp: 16   Temp: 97.6 °F (36.4 °C)       I/O last 3 completed shifts:  In: 3774.2 [P.O.:1920; I.V.:1854.2]  Out: 1450 [Urine:1100; Blood:350]     Gen: awake, alert, not in acute distress    Abdomen nondistended, non-tender      Right Lower Extremity:   Dressing clean, dry, intact.    Sensation intact to light touch in Sural/Saphenous/Tibial/Superficial Peroneal/Deep Peroneal and Tibial distributions.   Motor exam : 5/5 EHL/FHL/TA/GSC.  Vascular exam: Palpable dorsalis pedis pulses. Cap refill ~2s in the toes. Foot warm and well perfused      Labs:      Lab Results   Component Value Date    HGB 15.0 07/24/2024    HGB 11.4 (L) 08/04/2023    HGB 12.6 08/03/2023         Lab Results   Component Value Date    INR 0.90 07/24/2024    INR 0.91 07/26/2023           ASSESSMENT/PLAN: 68 year old yo female s/p right total hip arthroplasty on  8/2/2024     - Weight bearing status: WBAT RLE  - no hip precautions  - Mechanical DVT prophylaxis and chemoprophylaxis with ASA 81 BID   - 24 hours of perioperative antibiotics  - PT for mobilization and motion  - Advance diet as tolerated. Discontinue IV fluids POD1 if tolerating diet.   - Multimodal pain control regimen ordered  - Disposition: Pending PT Evaluation. Home with outpatient PT at home.     Omar Behery, MD  [facility]

## 2024-08-03 NOTE — PLAN OF CARE
Patient is AO x4. POD 1. Dressing to right hip CDI. V/s stable. Up to bathroom voiding freely. Up with 1assist and RW. SCDs on and ASA for DVT prophylaxis. Using ice packs. PRN oxycodone for pain control. Also on scheduled Tylenol, Toradol, and Tramadol. Using IS. Call light within reach. Fall precautions. Plan for home with OP PT in home once medically cleared.     Problem: Patient Centered Care  Goal: Patient preferences are identified and integrated in the patient's plan of care  Description: Interventions:  - What would you like us to know as we care for you? Home with   - Provide timely, complete, and accurate information to patient/family  - Incorporate patient and family knowledge, values, beliefs, and cultural backgrounds into the planning and delivery of care  - Encourage patient/family to participate in care and decision-making at the level they choose  - Honor patient and family perspectives and choices  Outcome: Progressing     Problem: Patient/Family Goals  Goal: Patient/Family Long Term Goal  Description: Patient's Long Term Goal:     Interventions:  -   - See additional Care Plan goals for specific interventions  Outcome: Progressing  Goal: Patient/Family Short Term Goal  Description: Patient's Short Term Goal:     Interventions:   -   - See additional Care Plan goals for specific interventions  Outcome: Progressing     Problem: PAIN - ADULT  Goal: Verbalizes/displays adequate comfort level or patient's stated pain goal  Description: INTERVENTIONS:  - Encourage pt to monitor pain and request assistance  - Assess pain using appropriate pain scale  - Administer analgesics based on type and severity of pain and evaluate response  - Implement non-pharmacological measures as appropriate and evaluate response  - Consider cultural and social influences on pain and pain management  - Manage/alleviate anxiety  - Utilize distraction and/or relaxation techniques  - Monitor for opioid side effects  -  Notify MD/LIP if interventions unsuccessful or patient reports new pain  - Anticipate increased pain with activity and pre-medicate as appropriate  Outcome: Progressing     Problem: SAFETY ADULT - FALL  Goal: Free from fall injury  Description: INTERVENTIONS:  - Assess pt frequently for physical needs  - Identify cognitive and physical deficits and behaviors that affect risk of falls.  - [place] fall precautions as indicated by assessment.  - Educate pt/family on patient safety including physical limitations  - Instruct pt to call for assistance with activity based on assessment  - Modify environment to reduce risk of injury  - Provide assistive devices as appropriate  - Consider OT/PT consult to assist with strengthening/mobility  - Encourage toileting schedule  Outcome: Progressing     Problem: DISCHARGE PLANNING  Goal: Discharge to home or other facility with appropriate resources  Description: INTERVENTIONS:  - Identify barriers to discharge w/pt and caregiver  - Include patient/family/discharge partner in discharge planning  - Arrange for needed discharge resources and transportation as appropriate  - Identify discharge learning needs (meds, wound care, etc)  - Arrange for interpreters to assist at discharge as needed  - Consider post-discharge preferences of patient/family/discharge partner  - Complete POLST form as appropriate  - Assess patient's ability to be responsible for managing their own health  - Refer to Case Management Department for coordinating discharge planning if the patient needs post-hospital services based on physician/LIP order or complex needs related to functional status, cognitive ability or social support system  Outcome: Progressing     Problem: SKIN/TISSUE INTEGRITY - ADULT  Goal: Incision(s), wounds(s) or drain site(s) healing without S/S of infection  Description: INTERVENTIONS:  - Assess and document risk factors for pressure ulcer development  - Assess and document skin  integrity  - Assess and document dressing/incision, wound bed, drain sites and surrounding tissue  - Implement wound care per orders  - Initiate isolation precautions as appropriate  - Initiate Pressure Ulcer prevention bundle as indicated  Outcome: Progressing

## 2024-08-03 NOTE — PHYSICAL THERAPY NOTE
PHYSICAL THERAPY TREATMENT NOTE - INPATIENT     Room Number: 426/426-A       Presenting Problem: s/p R ant JOHN 24; R hip OA  Co-Morbidities : obesity; hypothryoidism,    Problem List  Principal Problem:    Arthritis of right hip  Active Problems:    Essential hypertension    Hypothyroidism    Class 2 obesity due to excess calories without serious comorbidity with body mass index (BMI) of 39.0 to 39.9 in adult    Status post right hip replacement      PHYSICAL THERAPY ASSESSMENT   Patient demonstrates excellent progress this session, goals  progressing with ambulation and stair training this session.     Patient is requiring supervision as a result of the following impairments: decreased functional strength, pain, impaired dynamic balance, and limited R hip ROM.     Patient continues to function below baseline with bed mobility, transfers, gait, stair negotiation, and performing household tasks. Pt demos adequate safety and stability with functional mobility tasks and is safe to discharge from PT standpoint. Physical Therapy will continue to follow patient for duration of hospitalization.    Patient continues to benefit from continued skilled PT services: at discharge to promote prior level of function and safety with additional support and return home with home health PT.    PLAN  PT Treatment Plan: Bed mobility;Body mechanics;Patient education;Gait training;Range of motion;Strengthening;Stoop training;Stair training;Transfer training  Frequency (Obs): Daily    SUBJECTIVE  Agreeable to activity    OBJECTIVE  Precautions: Limb alert - right    WEIGHT BEARING RESTRICTION        R Lower Extremity: Weight Bearing as Tolerated       PAIN ASSESSMENT   Ratin  Location: R hip  Management Techniques: Body mechanics;Activity promotion    BALANCE  Static Sitting: Good  Dynamic Sitting: Good  Static Standing: Fair  Dynamic Standing: Fair -        AM-PAC '6-Clicks' INPATIENT SHORT FORM - BASIC MOBILITY  How much  difficulty does the patient currently have...  Patient Difficulty: Turning over in bed (including adjusting bedclothes, sheets and blankets)?: A Little   Patient Difficulty: Sitting down on and standing up from a chair with arms (e.g., wheelchair, bedside commode, etc.): A Little   Patient Difficulty: Moving from lying on back to sitting on the side of the bed?: A Little   How much help from another person does the patient currently need...   Help from Another: Moving to and from a bed to a chair (including a wheelchair)?: A Little   Help from Another: Need to walk in hospital room?: A Little   Help from Another: Climbing 3-5 steps with a railing?: A Little     AM-PAC Score:  Raw Score: 18   Approx Degree of Impairment: 46.58%   Standardized Score (AM-PAC Scale): 43.63   CMS Modifier (G-Code): CK    FUNCTIONAL ABILITY STATUS  Functional Mobility/Gait Assessment  Gait Assistance: Supervision  Distance (ft): 300 ft x2  Assistive Device: Rolling walker  Pattern: R Decreased stance time (slow pace, decreased step length, steady no LOB)  Stairs: Stairs  How Many Stairs: 4  Device: 2 Rails  Assist: Supervision  Pattern: Ascend and Descend  Ascend and Descend : Step to  Sit to Stand: supervision with RW    Skilled Therapy Provided:  at bedside.  Pt rec'd in chair agreeable to therapy, identified by name and , gait belt donned for mobility. Pt with gait progression this session, also able to negotiate stairs again with supervision. Pt ed provided on importance of OOB mobility once home, use of ice, use of RW, HEP therex, and car transfers, all with good pt understanding.     The patient's Approx Degree of Impairment: 46.58% has been calculated based on documentation in the [facility] '6 clicks' Inpatient Daily Activity Short Form.  Research supports that patients with this level of impairment may benefit from HHPT.  Final disposition will be made by interdisciplinary medical team.      Patient End of Session: Up in  chair;Call light within reach;Needs met;RN aware of session/findings;Family present    CURRENT GOALS   Goals to be met by: 8/7/24  Patient Goal Patient's self-stated goal is: to return to PLOF   Goal #1 Patient is able to demonstrate supine - sit EOB @ level: modified independent   Goal #1   Current Status  NT   Goal #2 Patient is able to demonstrate transfers Sit to/from Stand at assistance level: modified independent      Goal #2  Current Status  supervision with RW   Goal #3 Patient is able to ambulate 300 feet with assistive device at assistance level: modified independent    Goal #3   Current Status  300 ft x2 with RW supervision   Goal #4 Patient will negotiate 4 stairs/one curb w/ assistive device and supervision   Goal #4   Current Status GOAL MET   Goal #5 Patient verbalizes and/or demonstrates all precautions and safety concerns independently   Goal #5   Current Status  in progress   Goal #6 Patient independently performs home exercise program for ROM/strengthening per the instructions provided in preparation for discharge.   Goal #6  Current Status  ongoing      Gait Training: 15 minutes  Therapeutic Activity: 10 minutes

## 2024-08-03 NOTE — PLAN OF CARE
Patient A&Ox4. POD1 of right hip. Scheduled tylenol, toradol and tramdol given. Oxy given for pain as well. Ice packs applied. Up x1 with a walker. Monitoring VS. Voiding freely. SCDs, TEDS and ASA for DVT prophylaxis. Encouraged frequent ambulation and use of incentive spirometer. Fall precautions in place- call light and personal belongings within reach, non-skid socks in place to bilateral feet. Frequent rounding by nursing staff.     Patient cleared by internal medicine, ortho, PT/OT, and social work. Going home with home health. Verified that pain medications are at patient's pharmacy. IV removed, discharge education provided, patient sent home with all personal belongings, and discharge instructions. Addressed additional questions.         Problem: Patient Centered Care  Goal: Patient preferences are identified and integrated in the patient's plan of care  Description: Interventions:  - What would you like us to know as we care for you?   - Provide timely, complete, and accurate information to patient/family  - Incorporate patient and family knowledge, values, beliefs, and cultural backgrounds into the planning and delivery of care  - Encourage patient/family to participate in care and decision-making at the level they choose  - Honor patient and family perspectives and choices  Outcome: Adequate for Discharge     Problem: PAIN - ADULT  Goal: Verbalizes/displays adequate comfort level or patient's stated pain goal  Description: INTERVENTIONS:  - Encourage pt to monitor pain and request assistance  - Assess pain using appropriate pain scale  - Administer analgesics based on type and severity of pain and evaluate response  - Implement non-pharmacological measures as appropriate and evaluate response  - Consider cultural and social influences on pain and pain management  - Manage/alleviate anxiety  - Utilize distraction and/or relaxation techniques  - Monitor for opioid side effects  - Notify MD/LIP if  interventions unsuccessful or patient reports new pain  - Anticipate increased pain with activity and pre-medicate as appropriate  Outcome: Adequate for Discharge     Problem: SAFETY ADULT - FALL  Goal: Free from fall injury  Description: INTERVENTIONS:  - Assess pt frequently for physical needs  - Identify cognitive and physical deficits and behaviors that affect risk of falls.  - [place] fall precautions as indicated by assessment.  - Educate pt/family on patient safety including physical limitations  - Instruct pt to call for assistance with activity based on assessment  - Modify environment to reduce risk of injury  - Provide assistive devices as appropriate  - Consider OT/PT consult to assist with strengthening/mobility  - Encourage toileting schedule  Outcome: Adequate for Discharge     Problem: DISCHARGE PLANNING  Goal: Discharge to home or other facility with appropriate resources  Description: INTERVENTIONS:  - Identify barriers to discharge w/pt and caregiver  - Include patient/family/discharge partner in discharge planning  - Arrange for needed discharge resources and transportation as appropriate  - Identify discharge learning needs (meds, wound care, etc)  - Arrange for interpreters to assist at discharge as needed  - Consider post-discharge preferences of patient/family/discharge partner  - Complete POLST form as appropriate  - Assess patient's ability to be responsible for managing their own health  - Refer to Case Management Department for coordinating discharge planning if the patient needs post-hospital services based on physician/LIP order or complex needs related to functional status, cognitive ability or social support system  Outcome: Adequate for Discharge     Problem: SKIN/TISSUE INTEGRITY - ADULT  Goal: Incision(s), wounds(s) or drain site(s) healing without S/S of infection  Description: INTERVENTIONS:  - Assess and document risk factors for pressure ulcer development  - Assess and  document skin integrity  - Assess and document dressing/incision, wound bed, drain sites and surrounding tissue  - Implement wound care per orders  - Initiate isolation precautions as appropriate  - Initiate Pressure Ulcer prevention bundle as indicated  Outcome: Adequate for Discharge

## 2024-08-03 NOTE — OCCUPATIONAL THERAPY NOTE
OCCUPATIONAL THERAPY EVALUATION - INPATIENT     Room Number: 426/426-A  Evaluation Date: 8/3/2024  Type of Evaluation: Initial  Presenting Problem: R JOHN anterior    Physician Order: IP Consult to Occupational Therapy  Reason for Therapy: ADL/IADL Dysfunction and Discharge Planning    OCCUPATIONAL THERAPY ASSESSMENT   Patient is a 68 year old female admitted 8/2/2024 for R JOHN.  Prior to admission, patient's baseline is I with ADLs,IADLs, driving,working.  Patient is currently functioning near baseline with toileting, lower body dressing, bed mobility, transfers, dynamic standing balance, functional standing tolerance, and energy conservation strategies.  Patient is requiring  SBA functional mobility.MAX A LBD  as a result of the following impairments: decreased functional strength, decreased functional reach, decreased endurance, pain, impaired dynamic balance, and decreased muscular endurance. Occupational Therapy will continue to follow for duration of hospitalization.    Patient will benefit from continued skilled OT Services at discharge to promote prior level of function.  Anticipate patient will return home with home health OT    PLAN  OT Treatment Plan: Balance activities;Energy conservation/work simplification techniques;ADL training;Functional transfer training;Patient/Family education;Patient/Family training  OT Device Recommendations: TBD    OCCUPATIONAL THERAPY MEDICAL/SOCIAL HISTORY   Problem List   Principal Problem:    Arthritis of right hip  Active Problems:    Essential hypertension    Hypothyroidism    Class 2 obesity due to excess calories without serious comorbidity with body mass index (BMI) of 39.0 to 39.9 in adult    Status post right hip replacement    HOME SITUATION  Type of Home: House  Home Layout: One level (with basement;  steps to enter)  Lives With: Spouse  Toilet and Equipment: Standard height toilet  Shower/Tub and Equipment: Tub-shower combo  Other Equipment:  None  Occupation/Status: retired  Hand Dominance: Right  Drives: Yes  Patient Regularly Uses: None    Stairs in Home: 4  Assistive Device(s) Used: none, has a cane and RW     Prior Level of [place]: I with ADLs,IADLs, driving,consultant for [employer]    SUBJECTIVE  \"I have had so many surgeries, this isn't too bad!\"    OCCUPATIONAL THERAPY EXAMINATION   OBJECTIVE  Precautions: Limb alert - right  Fall Risk: Standard fall risk    WEIGHT BEARING RESTRICTION  R Lower Extremity: Weight Bearing as Tolerated    PAIN ASSESSMENT  Ratin  Location: R hip  Management Techniques: Activity promotion; Repositioning    COGNITION  Overall Cognitive Status:  WFL - within functional limits    RANGE OF MOTION   Upper extremity ROM is within functional limits     STRENGTH ASSESSMENT  Upper extremity strength is within functional limits     ACTIVITIES OF DAILY LIVING ASSESSMENT  AM-PAC ‘6-Clicks’ Inpatient Daily Activity Short Form  How much help from another person does the patient currently need…  -   Putting on and taking off regular lower body clothing?: A Lot  -   Bathing (including washing, rinsing, drying)?: A Lot  -   Toileting, which includes using toilet, bedpan or urinal? : A Little  -   Putting on and taking off regular upper body clothing?: A Little  -   Taking care of personal grooming such as brushing teeth?: None  -   Eating meals?: None    AM-PAC Score:  Score: 18  Approx Degree of Impairment: 46.65%  Standardized Score (AM-PAC Scale): 38.66  CMS Modifier (G-Code): CK    FUNCTIONAL TRANSFER ASSESSMENT  Sit to Stand: Edge of Bed; Chair  Edge of Bed: Stand-by Assist  Chair: Stand-by Assist    BED MOBILITY     BALANCE ASSESSMENT  Static Sitting: Stand-by Assist  Sitting Bilateral: Stand-by Assist  Static Standing: Stand-by Assist  Standing Bilateral: Stand-by Assist    FUNCTIONAL ADL ASSESSMENT  Eating: Independent  Grooming Seated: Stand-by Assist  Bathing Seated: Minimal Assist  UB Dressing Seated: Stand-by  Assist  LB Dressing Seated: Maximum Assist  Toileting Seated: Stand-by Assist    THERAPEUTIC EXERCISE     Skilled Therapy Provided: Pt seen for skilled OT evaluation to address pt's I and safety with functional mobility, t/fs, and ADLs. Pt's RN approved session and pt agreeable. Pt performing functional mobility and t/fs with SBA with RW. Pt edu provided re: role of OT, safe t/f techniques, DME/AE for ADLs, fall prevention, safe RW management. Pt demo'd understanding of all concepts covered. End of session, pt up in chair, all needs met, family at bedside. RN aware of pt status. Continue skilled OT.       EDUCATION PROVIDED  Patient: Role of Occupational Therapy; Plan of Care; Adaptive Equipment Recommendations; DME Recommendations; Functional Transfer Techniques; Fall Prevention; Weight Bear Status; Surgical Precautions; Compensatory ADL Techniques; Proper Body Mechanics  Patient's Response to Education: Verbalized Understanding; Returned Demonstration    The patient's Approx Degree of Impairment: 46.65% has been calculated based on documentation in the [facility] '6 clicks' Inpatient Daily Activity Short Form.  Research supports that patients with this level of impairment may benefit from home with hhot.  Final disposition will be made by interdisciplinary medical team.    Patient End of Session: Up in chair;Needs met;Call light within reach;RN aware of session/findings;Family present    OT Goals  Patient self-stated goal is: go home     Patient will complete LE dressing with MOD I   Comment:     Patient will complete toilet transfer with MOD I  Comment:     Patient will complete self care task at sink level with MOD I    Comment:             Goals  on: 9/3/24  Frequency: 3-5x/week    Patient Evaluation Complexity Level:   Occupational Profile/Medical History MODERATE - Expanded review of history including review of medical or therapy record   Specific performance deficits impacting engagement in ADL/IADL  MODERATE  3 - 5 performance deficits   Client Assessment/Performance Deficits MODERATE - Comorbidities and min to mod modifications of tasks    Clinical Decision Making MODERATE - Analysis of occupational profile, detailed assessments, several treatment options    Overall Complexity MODERATE     OT Session Time: 25 minutes  Self-Care Home Management: 10 minutes  Therapeutic Activity: 15 minutes    Angela Paz, Occupational Therapist, MS, OTR/L

## 2024-08-03 NOTE — CM/SW NOTE
08/03/24 1200   Discharge disposition   Expected discharge disposition Home-Health   Post Acute Care Provider   (OP at home)   Discharge transportation Private car

## 2024-08-03 NOTE — PROGRESS NOTES
[facility]  part of Located within [facility]    Progress Note    Teodora Burns Patient Status:  Outpatient in a Bed    1955 MRN [id number]   Location [facility] 4W/SW/SE Attending Behery, Omar Atef, MD   Hosp Day # 0 PCP YOLA QUINTANILLA PA       Subjective:   Teodora Burns is a(n) 68 year old female POD# 1 Feels she is doing well. No chest pain, dyspnea or nausea.     Objective:   Vital Signs:  Blood pressure 97/55, pulse 55, temperature 97.4 °F (36.3 °C), temperature source Temporal, resp. rate 16, height 5' 7\" (1.702 m), weight 256 lb (116.1 kg), SpO2 99%.     General: No acute distress. Alert and oriented x 3.  HEENT: Moist mucous membranes. EOM-I. PERRL  Respiratory: Clear to auscultation bilaterally.  No wheezes. No rhonchi.  Cardiovascular: S1, S2.  Regular rate and rhythm.  No murmurs.  Abdomen: Soft, nontender, nondistended.   Neurologic: No focal neurological deficits.  Musculoskeletal: No calf tenderness.   Integument: No lesions. No erythema.  Psychiatric: Appropriate mood and affect.      Results:    Lab Results   Component Value Date    WBC 8.7 2024    HGB 11.8 (L) 2024    HCT 34.0 (L) 2024    .0 (L) 2024    CREATSERUM 0.77 2024    BUN 14 2024     2024    K 4.7 2024     2024    CO2 28.0 2024     (H) 2024    CA 9.9 2024    ALB 5.1 (H) 2024    ALKPHO 111 2024    BILT 0.6 2024    TP 7.9 2024    AST 26 2024    ALT 32 2024    PTT 26.5 2024    INR 0.90 2024         XR FLUOROSCOPY C-ARM TIME LESS THAN 1 HOUR (CPT=76000)    Result Date: 2024  CONCLUSION: Intraoperative fluoroscopy provided.  Please see surgical note for specific details.     Dictated by (CST): Candelario Shepherd MD on 2024 at 11:11 AM     Finalized by (CST): Candelario Shepherd MD on 2024 at 11:12 AM               Assessment and Plan:       Arthritis of right  hip        Status post right hip replacement  Stable for discharge      Essential hypertension        Class 2 obesity due to excess calories without serious comorbidity with body mass index (BMI) of 39.0 to 39.9 in adult        Hypothyroidism                Postoperative Recommendations:  Anticoagulation / DVT prophylaxis: SCDs, early ambulation. ASA 81 mg twice daily with food for four weeks.    Pain Control: per ortho  GI protection: Protonix  Incentive Spirometry   Telemetry as needed      Pain management and Physical therapy as per Orthopedic service.   Home Health as needed          Pawan Delarosa MD  8/3/2024

## 2024-10-09 ENCOUNTER — TELEPHONE (OUTPATIENT)
Dept: FAMILY MEDICINE CLINIC | Facility: CLINIC | Age: 69
End: 2024-10-09

## 2024-10-09 NOTE — TELEPHONE ENCOUNTER
Patient was seen today in clinic.  I discussed results in clinic, please see clinic progress note.    Carmen Beckett MD 7/16/2021    Teodora calling to see if Dr. Delarosa would be able to talk about insulin resistance. Teodora just had surgery 07/24/24, RTHA with Dr. Behery that we cleared her for. Her # is [phone].
